# Patient Record
Sex: MALE | Race: WHITE | Employment: OTHER | URBAN - METROPOLITAN AREA
[De-identification: names, ages, dates, MRNs, and addresses within clinical notes are randomized per-mention and may not be internally consistent; named-entity substitution may affect disease eponyms.]

---

## 2017-01-03 ENCOUNTER — HOSPITAL ENCOUNTER (OUTPATIENT)
Dept: PHARMACY | Age: 82
Discharge: HOME | End: 2017-01-03
Payer: MEDICARE

## 2017-01-03 DIAGNOSIS — I48.91 ATRIAL FIBRILLATION, UNSPECIFIED TYPE (HCC): ICD-10-CM

## 2017-01-03 LAB
INR BLD: 2.9
PROTIME: 34.6 SECONDS

## 2017-01-03 PROCEDURE — G0463 HOSPITAL OUTPT CLINIC VISIT: HCPCS

## 2017-01-03 PROCEDURE — 85610 PROTHROMBIN TIME: CPT

## 2017-01-13 ENCOUNTER — HOSPITAL ENCOUNTER (OUTPATIENT)
Dept: CARDIOLOGY | Age: 82
Discharge: HOME | End: 2017-01-13
Payer: MEDICARE

## 2017-01-13 PROCEDURE — 93296 REM INTERROG EVL PM/IDS: CPT

## 2017-01-30 DIAGNOSIS — M1A.0790 IDIOPATHIC CHRONIC GOUT OF FOOT WITHOUT TOPHUS, UNSPECIFIED LATERALITY: ICD-10-CM

## 2017-01-30 DIAGNOSIS — E78.5 HYPERLIPIDEMIA, UNSPECIFIED HYPERLIPIDEMIA TYPE: ICD-10-CM

## 2017-01-30 DIAGNOSIS — I10 ESSENTIAL HYPERTENSION, BENIGN: ICD-10-CM

## 2017-01-30 DIAGNOSIS — I48.91 ATRIAL FIBRILLATION, UNSPECIFIED TYPE (HCC): ICD-10-CM

## 2017-01-30 LAB
ANION GAP SERPL CALCULATED.3IONS-SCNC: 10 MEQ/L (ref 7–13)
BUN BLDV-MCNC: 18 MG/DL (ref 8–23)
CALCIUM SERPL-MCNC: 9.2 MG/DL (ref 8.6–10.2)
CHLORIDE BLD-SCNC: 100 MEQ/L (ref 98–107)
CHOLESTEROL, TOTAL: 166 MG/DL (ref 0–199)
CO2: 27 MEQ/L (ref 22–29)
CREAT SERPL-MCNC: 0.97 MG/DL (ref 0.7–1.2)
GFR AFRICAN AMERICAN: >60
GFR NON-AFRICAN AMERICAN: >60
GLUCOSE BLD-MCNC: 91 MG/DL (ref 74–109)
HCT VFR BLD CALC: 41.8 % (ref 42–52)
HDLC SERPL-MCNC: 67 MG/DL (ref 40–59)
HEMOGLOBIN: 13.8 G/DL (ref 14–18)
LDL CHOLESTEROL CALCULATED: 78 MG/DL (ref 0–129)
MCH RBC QN AUTO: 31.7 PG (ref 27–31.3)
MCHC RBC AUTO-ENTMCNC: 33 % (ref 33–37)
MCV RBC AUTO: 96.1 FL (ref 80–100)
PDW BLD-RTO: 14.1 % (ref 11.5–14.5)
PLATELET # BLD: 186 K/UL (ref 130–400)
POTASSIUM SERPL-SCNC: 4.4 MEQ/L (ref 3.5–5.1)
PROSTATE SPECIFIC ANTIGEN: 2.67 NG/ML (ref 0–6.22)
RBC # BLD: 4.35 M/UL (ref 4.7–6.1)
SODIUM BLD-SCNC: 137 MEQ/L (ref 132–144)
TRIGL SERPL-MCNC: 106 MG/DL (ref 0–200)
URIC ACID, SERUM: 4.5 MG/DL (ref 3.4–7)
WBC # BLD: 8.4 K/UL (ref 4.8–10.8)

## 2017-01-31 ENCOUNTER — HOSPITAL ENCOUNTER (OUTPATIENT)
Dept: PHARMACY | Age: 82
Discharge: HOME | End: 2017-01-31
Payer: MEDICARE

## 2017-01-31 DIAGNOSIS — I48.91 ATRIAL FIBRILLATION, UNSPECIFIED TYPE (HCC): ICD-10-CM

## 2017-01-31 LAB
INR BLD: 2.6
PROTIME: 31 SECONDS

## 2017-01-31 PROCEDURE — 85610 PROTHROMBIN TIME: CPT | Performed by: PHARMACIST

## 2017-01-31 PROCEDURE — G0463 HOSPITAL OUTPT CLINIC VISIT: HCPCS | Performed by: PHARMACIST

## 2017-02-02 ENCOUNTER — OFFICE VISIT (OUTPATIENT)
Dept: UROLOGY | Age: 82
End: 2017-02-02

## 2017-02-02 ENCOUNTER — OFFICE VISIT (OUTPATIENT)
Dept: INTERNAL MEDICINE | Age: 82
End: 2017-02-02

## 2017-02-02 VITALS
WEIGHT: 195 LBS | HEART RATE: 74 BPM | DIASTOLIC BLOOD PRESSURE: 60 MMHG | SYSTOLIC BLOOD PRESSURE: 114 MMHG | HEIGHT: 73 IN | BODY MASS INDEX: 25.84 KG/M2

## 2017-02-02 VITALS
RESPIRATION RATE: 16 BRPM | HEIGHT: 73 IN | TEMPERATURE: 97.9 F | DIASTOLIC BLOOD PRESSURE: 68 MMHG | HEART RATE: 74 BPM | SYSTOLIC BLOOD PRESSURE: 104 MMHG | WEIGHT: 195 LBS | OXYGEN SATURATION: 99 % | BODY MASS INDEX: 25.84 KG/M2

## 2017-02-02 DIAGNOSIS — E78.5 HYPERLIPIDEMIA, UNSPECIFIED HYPERLIPIDEMIA TYPE: ICD-10-CM

## 2017-02-02 DIAGNOSIS — I10 ESSENTIAL HYPERTENSION, BENIGN: Primary | ICD-10-CM

## 2017-02-02 DIAGNOSIS — R97.20 ELEVATED PSA: Primary | ICD-10-CM

## 2017-02-02 DIAGNOSIS — F02.81 ALZHEIMER'S DEMENTIA WITH BEHAVIORAL DISTURBANCE, UNSPECIFIED TIMING OF DEMENTIA ONSET: ICD-10-CM

## 2017-02-02 DIAGNOSIS — N40.1 BPH WITH OBSTRUCTION/LOWER URINARY TRACT SYMPTOMS: ICD-10-CM

## 2017-02-02 DIAGNOSIS — Z95.0 PRESENCE OF PERMANENT CARDIAC PACEMAKER: ICD-10-CM

## 2017-02-02 DIAGNOSIS — N13.8 BPH WITH OBSTRUCTION/LOWER URINARY TRACT SYMPTOMS: ICD-10-CM

## 2017-02-02 DIAGNOSIS — D64.9 NORMOCYTIC ANEMIA: ICD-10-CM

## 2017-02-02 DIAGNOSIS — I48.91 ATRIAL FIBRILLATION, UNSPECIFIED TYPE (HCC): ICD-10-CM

## 2017-02-02 DIAGNOSIS — G30.9 ALZHEIMER'S DEMENTIA WITH BEHAVIORAL DISTURBANCE, UNSPECIFIED TIMING OF DEMENTIA ONSET: ICD-10-CM

## 2017-02-02 PROCEDURE — 4040F PNEUMOC VAC/ADMIN/RCVD: CPT | Performed by: UROLOGY

## 2017-02-02 PROCEDURE — G8420 CALC BMI NORM PARAMETERS: HCPCS | Performed by: UROLOGY

## 2017-02-02 PROCEDURE — 1123F ACP DISCUSS/DSCN MKR DOCD: CPT | Performed by: UROLOGY

## 2017-02-02 PROCEDURE — 99213 OFFICE O/P EST LOW 20 MIN: CPT | Performed by: INTERNAL MEDICINE

## 2017-02-02 PROCEDURE — G8484 FLU IMMUNIZE NO ADMIN: HCPCS | Performed by: UROLOGY

## 2017-02-02 PROCEDURE — 1036F TOBACCO NON-USER: CPT | Performed by: INTERNAL MEDICINE

## 2017-02-02 PROCEDURE — 1123F ACP DISCUSS/DSCN MKR DOCD: CPT | Performed by: INTERNAL MEDICINE

## 2017-02-02 PROCEDURE — 99213 OFFICE O/P EST LOW 20 MIN: CPT | Performed by: UROLOGY

## 2017-02-02 PROCEDURE — G8427 DOCREV CUR MEDS BY ELIG CLIN: HCPCS | Performed by: UROLOGY

## 2017-02-02 PROCEDURE — 4040F PNEUMOC VAC/ADMIN/RCVD: CPT | Performed by: INTERNAL MEDICINE

## 2017-02-02 PROCEDURE — 1036F TOBACCO NON-USER: CPT | Performed by: UROLOGY

## 2017-02-02 PROCEDURE — G8420 CALC BMI NORM PARAMETERS: HCPCS | Performed by: INTERNAL MEDICINE

## 2017-02-02 PROCEDURE — G8427 DOCREV CUR MEDS BY ELIG CLIN: HCPCS | Performed by: INTERNAL MEDICINE

## 2017-02-02 PROCEDURE — G8484 FLU IMMUNIZE NO ADMIN: HCPCS | Performed by: INTERNAL MEDICINE

## 2017-02-02 RX ORDER — FINASTERIDE 5 MG/1
5 TABLET, FILM COATED ORAL DAILY
Qty: 90 TABLET | Refills: 3 | Status: SHIPPED | OUTPATIENT
Start: 2017-02-02

## 2017-02-02 ASSESSMENT — ENCOUNTER SYMPTOMS
SHORTNESS OF BREATH: 0
ABDOMINAL PAIN: 0
ABDOMINAL DISTENTION: 0

## 2017-02-20 ASSESSMENT — ENCOUNTER SYMPTOMS
ABDOMINAL PAIN: 0
BACK PAIN: 0
WHEEZING: 0
SHORTNESS OF BREATH: 0
COUGH: 0

## 2017-02-26 ENCOUNTER — HOSPITAL ENCOUNTER (EMERGENCY)
Age: 82
Discharge: HOME | End: 2017-02-26
Attending: EMERGENCY MEDICINE
Payer: MEDICARE

## 2017-02-26 VITALS
SYSTOLIC BLOOD PRESSURE: 119 MMHG | HEART RATE: 95 BPM | BODY MASS INDEX: 25.84 KG/M2 | RESPIRATION RATE: 14 BRPM | OXYGEN SATURATION: 97 % | WEIGHT: 195 LBS | TEMPERATURE: 97.7 F | HEIGHT: 73 IN | DIASTOLIC BLOOD PRESSURE: 86 MMHG

## 2017-02-26 DIAGNOSIS — F03.90 DEMENTIA WITHOUT BEHAVIORAL DISTURBANCE, UNSPECIFIED DEMENTIA TYPE: Primary | ICD-10-CM

## 2017-02-26 PROCEDURE — 99285 EMERGENCY DEPT VISIT HI MDM: CPT

## 2017-02-26 ASSESSMENT — ENCOUNTER SYMPTOMS
COUGH: 0
NAUSEA: 0
ABDOMINAL PAIN: 0
VOMITING: 0
SHORTNESS OF BREATH: 0
DIARRHEA: 0

## 2017-02-28 ENCOUNTER — HOSPITAL ENCOUNTER (OUTPATIENT)
Dept: PHARMACY | Age: 82
Discharge: HOME | End: 2017-02-28
Payer: MEDICARE

## 2017-02-28 DIAGNOSIS — I48.91 ATRIAL FIBRILLATION, UNSPECIFIED TYPE (HCC): ICD-10-CM

## 2017-02-28 LAB
INR BLD: 3.1
PROTIME: 37.4 SECONDS

## 2017-02-28 PROCEDURE — G0463 HOSPITAL OUTPT CLINIC VISIT: HCPCS | Performed by: PHARMACIST

## 2017-02-28 PROCEDURE — 85610 PROTHROMBIN TIME: CPT | Performed by: PHARMACIST

## 2017-03-28 ENCOUNTER — HOSPITAL ENCOUNTER (OUTPATIENT)
Dept: PHARMACY | Age: 82
Discharge: HOME | End: 2017-03-28
Payer: MEDICARE

## 2017-03-28 DIAGNOSIS — I48.91 ATRIAL FIBRILLATION, UNSPECIFIED TYPE (HCC): ICD-10-CM

## 2017-03-28 LAB
INR BLD: 2.9
PROTIME: 34.6 SECONDS

## 2017-03-28 PROCEDURE — G0463 HOSPITAL OUTPT CLINIC VISIT: HCPCS

## 2017-03-28 PROCEDURE — 85610 PROTHROMBIN TIME: CPT

## 2017-04-04 ENCOUNTER — OFFICE VISIT (OUTPATIENT)
Dept: INTERNAL MEDICINE | Age: 82
End: 2017-04-04

## 2017-04-04 VITALS
HEART RATE: 82 BPM | SYSTOLIC BLOOD PRESSURE: 98 MMHG | DIASTOLIC BLOOD PRESSURE: 62 MMHG | TEMPERATURE: 97.8 F | WEIGHT: 196 LBS | HEIGHT: 73 IN | OXYGEN SATURATION: 98 % | RESPIRATION RATE: 16 BRPM | BODY MASS INDEX: 25.98 KG/M2

## 2017-04-04 DIAGNOSIS — M54.50 ACUTE RIGHT-SIDED LOW BACK PAIN WITHOUT SCIATICA: ICD-10-CM

## 2017-04-04 DIAGNOSIS — R60.0 BILATERAL EDEMA OF LOWER EXTREMITY: Primary | ICD-10-CM

## 2017-04-04 DIAGNOSIS — R60.0 BILATERAL EDEMA OF LOWER EXTREMITY: ICD-10-CM

## 2017-04-04 LAB
ALBUMIN SERPL-MCNC: 3.8 G/DL (ref 3.9–4.9)
ALP BLD-CCNC: 46 U/L (ref 35–104)
ALT SERPL-CCNC: 22 U/L (ref 0–41)
ANION GAP SERPL CALCULATED.3IONS-SCNC: 10 MEQ/L (ref 7–13)
AST SERPL-CCNC: 23 U/L (ref 0–40)
BILIRUB SERPL-MCNC: 0.6 MG/DL (ref 0–1.2)
BILIRUBIN URINE: NEGATIVE
BLOOD, URINE: NEGATIVE
BUN BLDV-MCNC: 15 MG/DL (ref 8–23)
CALCIUM SERPL-MCNC: 9.3 MG/DL (ref 8.6–10.2)
CHLORIDE BLD-SCNC: 101 MEQ/L (ref 98–107)
CLARITY: CLEAR
CO2: 27 MEQ/L (ref 22–29)
COLOR: YELLOW
CREAT SERPL-MCNC: 0.96 MG/DL (ref 0.7–1.2)
GFR AFRICAN AMERICAN: >60
GFR NON-AFRICAN AMERICAN: >60
GLOBULIN: 2.2 G/DL (ref 2.3–3.5)
GLUCOSE BLD-MCNC: 83 MG/DL (ref 74–109)
GLUCOSE URINE: NEGATIVE MG/DL
HCT VFR BLD CALC: 37 % (ref 42–52)
HEMOGLOBIN: 12.5 G/DL (ref 14–18)
KETONES, URINE: NEGATIVE MG/DL
LEUKOCYTE ESTERASE, URINE: NEGATIVE
MCH RBC QN AUTO: 32.4 PG (ref 27–31.3)
MCHC RBC AUTO-ENTMCNC: 33.8 % (ref 33–37)
MCV RBC AUTO: 95.8 FL (ref 80–100)
NITRITE, URINE: NEGATIVE
PDW BLD-RTO: 13.6 % (ref 11.5–14.5)
PH UA: 7.5 (ref 5–9)
PLATELET # BLD: 178 K/UL (ref 130–400)
POTASSIUM SERPL-SCNC: 4.2 MEQ/L (ref 3.5–5.1)
PROTEIN UA: NEGATIVE MG/DL
RBC # BLD: 3.87 M/UL (ref 4.7–6.1)
SODIUM BLD-SCNC: 138 MEQ/L (ref 132–144)
SPECIFIC GRAVITY UA: 1.01 (ref 1–1.03)
TOTAL PROTEIN: 6 G/DL (ref 6.4–8.1)
URINE REFLEX TO CULTURE: NORMAL
UROBILINOGEN, URINE: 0.2 E.U./DL
WBC # BLD: 6.6 K/UL (ref 4.8–10.8)

## 2017-04-04 PROCEDURE — 1036F TOBACCO NON-USER: CPT | Performed by: INTERNAL MEDICINE

## 2017-04-04 PROCEDURE — 1123F ACP DISCUSS/DSCN MKR DOCD: CPT | Performed by: INTERNAL MEDICINE

## 2017-04-04 PROCEDURE — 99213 OFFICE O/P EST LOW 20 MIN: CPT | Performed by: INTERNAL MEDICINE

## 2017-04-04 PROCEDURE — G8427 DOCREV CUR MEDS BY ELIG CLIN: HCPCS | Performed by: INTERNAL MEDICINE

## 2017-04-04 PROCEDURE — G8420 CALC BMI NORM PARAMETERS: HCPCS | Performed by: INTERNAL MEDICINE

## 2017-04-04 PROCEDURE — 4040F PNEUMOC VAC/ADMIN/RCVD: CPT | Performed by: INTERNAL MEDICINE

## 2017-04-09 ASSESSMENT — ENCOUNTER SYMPTOMS
COUGH: 0
WHEEZING: 0
ABDOMINAL PAIN: 0
SHORTNESS OF BREATH: 0

## 2017-04-14 ENCOUNTER — HOSPITAL ENCOUNTER (OUTPATIENT)
Dept: CARDIOLOGY | Age: 82
Discharge: HOME | End: 2017-04-14
Payer: MEDICARE

## 2017-04-14 PROCEDURE — 93296 REM INTERROG EVL PM/IDS: CPT

## 2017-05-09 ENCOUNTER — HOSPITAL ENCOUNTER (OUTPATIENT)
Dept: PHARMACY | Age: 82
Discharge: HOME | End: 2017-05-09
Payer: MEDICARE

## 2017-05-09 DIAGNOSIS — I48.91 ATRIAL FIBRILLATION, UNSPECIFIED TYPE (HCC): ICD-10-CM

## 2017-05-09 LAB
INR BLD: 2.8
PROTIME: 33.3 SECONDS

## 2017-05-09 PROCEDURE — 99211 OFF/OP EST MAY X REQ PHY/QHP: CPT

## 2017-05-09 PROCEDURE — 85610 PROTHROMBIN TIME: CPT

## 2017-05-17 ENCOUNTER — OFFICE VISIT (OUTPATIENT)
Dept: INTERNAL MEDICINE | Age: 82
End: 2017-05-17

## 2017-05-17 VITALS
SYSTOLIC BLOOD PRESSURE: 94 MMHG | OXYGEN SATURATION: 100 % | WEIGHT: 189 LBS | BODY MASS INDEX: 25.05 KG/M2 | RESPIRATION RATE: 16 BRPM | DIASTOLIC BLOOD PRESSURE: 62 MMHG | HEART RATE: 68 BPM | TEMPERATURE: 97.9 F | HEIGHT: 73 IN

## 2017-05-17 DIAGNOSIS — I10 ESSENTIAL HYPERTENSION, BENIGN: Primary | ICD-10-CM

## 2017-05-17 DIAGNOSIS — D64.9 NORMOCYTIC ANEMIA: ICD-10-CM

## 2017-05-17 DIAGNOSIS — R49.9 VOICE QUALITY DISORDER: ICD-10-CM

## 2017-05-17 DIAGNOSIS — M1A.0790 IDIOPATHIC CHRONIC GOUT OF FOOT WITHOUT TOPHUS, UNSPECIFIED LATERALITY: ICD-10-CM

## 2017-05-17 PROCEDURE — 99213 OFFICE O/P EST LOW 20 MIN: CPT | Performed by: INTERNAL MEDICINE

## 2017-05-17 PROCEDURE — 1036F TOBACCO NON-USER: CPT | Performed by: INTERNAL MEDICINE

## 2017-05-17 PROCEDURE — 1123F ACP DISCUSS/DSCN MKR DOCD: CPT | Performed by: INTERNAL MEDICINE

## 2017-05-17 PROCEDURE — 4040F PNEUMOC VAC/ADMIN/RCVD: CPT | Performed by: INTERNAL MEDICINE

## 2017-05-17 PROCEDURE — G8420 CALC BMI NORM PARAMETERS: HCPCS | Performed by: INTERNAL MEDICINE

## 2017-05-17 PROCEDURE — G8427 DOCREV CUR MEDS BY ELIG CLIN: HCPCS | Performed by: INTERNAL MEDICINE

## 2017-05-17 RX ORDER — ALLOPURINOL 300 MG/1
TABLET ORAL
Qty: 45 TABLET | Refills: 1 | Status: ON HOLD | OUTPATIENT
Start: 2017-05-17 | End: 2019-06-21

## 2017-06-20 ENCOUNTER — HOSPITAL ENCOUNTER (OUTPATIENT)
Dept: PHARMACY | Age: 82
Setting detail: THERAPIES SERIES
Discharge: HOME | End: 2017-06-20
Payer: MEDICARE

## 2017-06-20 DIAGNOSIS — I48.91 ATRIAL FIBRILLATION, UNSPECIFIED TYPE (HCC): ICD-10-CM

## 2017-06-20 LAB
INR BLD: 3.1
PROTIME: 36.9 SECONDS

## 2017-06-20 PROCEDURE — 99211 OFF/OP EST MAY X REQ PHY/QHP: CPT | Performed by: PHARMACIST

## 2017-06-20 PROCEDURE — 85610 PROTHROMBIN TIME: CPT | Performed by: PHARMACIST

## 2017-07-02 ENCOUNTER — HOSPITAL ENCOUNTER (EMERGENCY)
Age: 82
Discharge: HOME | End: 2017-07-02
Attending: FAMILY MEDICINE
Payer: MEDICARE

## 2017-07-02 VITALS
TEMPERATURE: 97.6 F | RESPIRATION RATE: 18 BRPM | HEART RATE: 88 BPM | OXYGEN SATURATION: 97 % | WEIGHT: 182 LBS | HEIGHT: 73 IN | SYSTOLIC BLOOD PRESSURE: 120 MMHG | BODY MASS INDEX: 24.12 KG/M2 | DIASTOLIC BLOOD PRESSURE: 78 MMHG

## 2017-07-02 DIAGNOSIS — R10.13 EPIGASTRIC ABDOMINAL PAIN: Primary | ICD-10-CM

## 2017-07-02 PROCEDURE — 99283 EMERGENCY DEPT VISIT LOW MDM: CPT

## 2017-07-02 RX ORDER — RISPERIDONE 0.5 MG/1
0.5 TABLET, FILM COATED ORAL NIGHTLY PRN
COMMUNITY

## 2017-07-02 ASSESSMENT — ENCOUNTER SYMPTOMS
WHEEZING: 0
CHEST TIGHTNESS: 0
APNEA: 0
CHOKING: 0
RESPIRATORY NEGATIVE: 1
COUGH: 0
STRIDOR: 0
SHORTNESS OF BREATH: 0

## 2017-07-09 ASSESSMENT — ENCOUNTER SYMPTOMS
BACK PAIN: 0
CONSTIPATION: 0
DIARRHEA: 0
VOMITING: 0
NAUSEA: 0
BLOOD IN STOOL: 0
ABDOMINAL PAIN: 0
SHORTNESS OF BREATH: 0
COUGH: 0

## 2017-07-14 ENCOUNTER — HOSPITAL ENCOUNTER (OUTPATIENT)
Dept: CARDIOLOGY | Age: 82
Discharge: HOME | End: 2017-07-14
Payer: MEDICARE

## 2017-07-14 PROCEDURE — 93279 PRGRMG DEV EVAL PM/LDLS PM: CPT

## 2017-07-24 RX ORDER — WARFARIN SODIUM 1 MG/1
TABLET ORAL
Qty: 100 TABLET | Refills: 1 | Status: SHIPPED | OUTPATIENT
Start: 2017-07-24 | End: 2018-02-22 | Stop reason: SDUPTHER

## 2017-08-01 ENCOUNTER — HOSPITAL ENCOUNTER (OUTPATIENT)
Dept: PHARMACY | Age: 82
Setting detail: THERAPIES SERIES
Discharge: HOME | End: 2017-08-01
Payer: MEDICARE

## 2017-08-01 DIAGNOSIS — I48.91 ATRIAL FIBRILLATION, UNSPECIFIED TYPE (HCC): ICD-10-CM

## 2017-08-01 LAB
INR BLD: 3
PROTIME: 36.1 SECONDS

## 2017-08-01 PROCEDURE — 99211 OFF/OP EST MAY X REQ PHY/QHP: CPT | Performed by: PHARMACIST

## 2017-08-01 PROCEDURE — 85610 PROTHROMBIN TIME: CPT | Performed by: PHARMACIST

## 2017-09-12 ENCOUNTER — HOSPITAL ENCOUNTER (OUTPATIENT)
Dept: PHARMACY | Age: 82
Setting detail: THERAPIES SERIES
Discharge: HOME | End: 2017-09-12
Payer: MEDICARE

## 2017-09-12 DIAGNOSIS — I48.91 ATRIAL FIBRILLATION, UNSPECIFIED TYPE (HCC): ICD-10-CM

## 2017-09-12 LAB
INR BLD: 2.3
PROTIME: 27.8 SECONDS

## 2017-09-12 PROCEDURE — 85610 PROTHROMBIN TIME: CPT | Performed by: PHARMACIST

## 2017-09-12 PROCEDURE — 99211 OFF/OP EST MAY X REQ PHY/QHP: CPT | Performed by: PHARMACIST

## 2017-09-13 LAB
ANION GAP SERPL CALCULATED.3IONS-SCNC: 15 MEQ/L (ref 7–13)
BUN BLDV-MCNC: 18 MG/DL (ref 8–23)
CHLORIDE BLD-SCNC: 102 MEQ/L (ref 98–107)
CHOLESTEROL, TOTAL: 142 MG/DL (ref 0–199)
CO2: 25 MEQ/L (ref 22–29)
CREAT SERPL-MCNC: 0.97 MG/DL (ref 0.7–1.2)
GFR AFRICAN AMERICAN: >60
GFR NON-AFRICAN AMERICAN: >60
HDLC SERPL-MCNC: 53 MG/DL (ref 40–59)
LDL CHOLESTEROL CALCULATED: 64 MG/DL (ref 0–129)
MAGNESIUM: 2 MG/DL (ref 1.7–2.3)
POTASSIUM SERPL-SCNC: 4.5 MEQ/L (ref 3.5–5.1)
SODIUM BLD-SCNC: 142 MEQ/L (ref 132–144)
TOTAL CK: 67 U/L (ref 0–190)
TRIGL SERPL-MCNC: 124 MG/DL (ref 0–200)

## 2017-10-13 ENCOUNTER — HOSPITAL ENCOUNTER (OUTPATIENT)
Dept: CARDIOLOGY | Age: 82
Discharge: HOME | End: 2017-10-13
Payer: MEDICARE

## 2017-10-13 PROCEDURE — 93296 REM INTERROG EVL PM/IDS: CPT

## 2017-10-24 ENCOUNTER — HOSPITAL ENCOUNTER (OUTPATIENT)
Dept: PHARMACY | Age: 82
Setting detail: THERAPIES SERIES
Discharge: HOME | End: 2017-10-24
Payer: MEDICARE

## 2017-10-24 DIAGNOSIS — I48.91 ATRIAL FIBRILLATION, UNSPECIFIED TYPE (HCC): ICD-10-CM

## 2017-10-24 LAB
INR BLD: 2.7
PROTIME: 32.9 SECONDS

## 2017-10-24 PROCEDURE — 85610 PROTHROMBIN TIME: CPT

## 2017-10-24 PROCEDURE — 99211 OFF/OP EST MAY X REQ PHY/QHP: CPT

## 2017-12-05 ENCOUNTER — HOSPITAL ENCOUNTER (OUTPATIENT)
Dept: PHARMACY | Age: 82
Setting detail: THERAPIES SERIES
Discharge: HOME | End: 2017-12-05
Payer: MEDICARE

## 2017-12-05 DIAGNOSIS — I48.91 ATRIAL FIBRILLATION, UNSPECIFIED TYPE (HCC): ICD-10-CM

## 2017-12-05 LAB
INR BLD: 2.4
PROTIME: 29 SECONDS

## 2017-12-05 PROCEDURE — 85610 PROTHROMBIN TIME: CPT | Performed by: PHARMACIST

## 2017-12-05 PROCEDURE — 99211 OFF/OP EST MAY X REQ PHY/QHP: CPT | Performed by: PHARMACIST

## 2017-12-05 NOTE — PROGRESS NOTES
Mr. Suze Cantu is a 81 y.o. y/o male with history of Afib who presents today for anticoagulation monitoring and adjustment.   INR 2.4 is therapeutic for this patient (goal range 2-3) and is reflective of 7 mg TWD  Patient verifies current dosing regimen, patient able to verbally recall dose  Patient reports no  missed doses since last INR   Patient denies s/sx clotting and/or stroke  Patient denies hematuria, epistaxis, rectal bleeding  Patient denies changes in diet, alcohol, or tobacco use  Reviewed medication list and drug allergies with patient, updated any medication additions or modifications accordingly  Patient also denies any pending medical or dental procedures scheduled at this time  Patient was instructed to continue 7mg TWD and RTC 6 weeks

## 2018-01-12 ENCOUNTER — HOSPITAL ENCOUNTER (OUTPATIENT)
Dept: CARDIOLOGY | Age: 83
Discharge: HOME | End: 2018-01-12
Payer: MEDICARE

## 2018-01-12 PROCEDURE — 93296 REM INTERROG EVL PM/IDS: CPT

## 2018-01-16 ENCOUNTER — HOSPITAL ENCOUNTER (OUTPATIENT)
Dept: PHARMACY | Age: 83
Setting detail: THERAPIES SERIES
Discharge: HOME | End: 2018-01-16
Payer: MEDICARE

## 2018-01-16 DIAGNOSIS — I48.91 ATRIAL FIBRILLATION, UNSPECIFIED TYPE (HCC): ICD-10-CM

## 2018-01-16 LAB
INR BLD: 2.5
PROTIME: 30.5 SECONDS

## 2018-01-16 PROCEDURE — 99211 OFF/OP EST MAY X REQ PHY/QHP: CPT | Performed by: PHARMACIST

## 2018-01-16 PROCEDURE — 85610 PROTHROMBIN TIME: CPT | Performed by: PHARMACIST

## 2018-01-26 DIAGNOSIS — R97.20 ELEVATED PSA: ICD-10-CM

## 2018-01-26 DIAGNOSIS — N13.8 BPH WITH OBSTRUCTION/LOWER URINARY TRACT SYMPTOMS: ICD-10-CM

## 2018-01-26 DIAGNOSIS — N40.1 BPH WITH OBSTRUCTION/LOWER URINARY TRACT SYMPTOMS: ICD-10-CM

## 2018-01-26 LAB
ANION GAP SERPL CALCULATED.3IONS-SCNC: 12 MEQ/L (ref 7–13)
BUN BLDV-MCNC: 17 MG/DL (ref 8–23)
CHLORIDE BLD-SCNC: 101 MEQ/L (ref 98–107)
CO2: 28 MEQ/L (ref 22–29)
CREAT SERPL-MCNC: 0.84 MG/DL (ref 0.7–1.2)
GFR AFRICAN AMERICAN: >60
GFR NON-AFRICAN AMERICAN: >60
MAGNESIUM: 1.8 MG/DL (ref 1.7–2.3)
POTASSIUM SERPL-SCNC: 4.5 MEQ/L (ref 3.5–5.1)
PROSTATE SPECIFIC ANTIGEN: 1.95 NG/ML (ref 0–6.22)
SODIUM BLD-SCNC: 141 MEQ/L (ref 132–144)
TSH SERPL DL<=0.05 MIU/L-ACNC: 3.68 UIU/ML (ref 0.27–4.2)

## 2018-02-01 ENCOUNTER — OFFICE VISIT (OUTPATIENT)
Dept: UROLOGY | Age: 83
End: 2018-02-01
Payer: MEDICARE

## 2018-02-01 VITALS
HEART RATE: 78 BPM | SYSTOLIC BLOOD PRESSURE: 100 MMHG | HEIGHT: 72 IN | BODY MASS INDEX: 24.79 KG/M2 | WEIGHT: 183 LBS | DIASTOLIC BLOOD PRESSURE: 54 MMHG

## 2018-02-01 DIAGNOSIS — N40.1 BPH WITH OBSTRUCTION/LOWER URINARY TRACT SYMPTOMS: Primary | ICD-10-CM

## 2018-02-01 DIAGNOSIS — N13.8 BPH WITH OBSTRUCTION/LOWER URINARY TRACT SYMPTOMS: Primary | ICD-10-CM

## 2018-02-01 DIAGNOSIS — Z87.448 H/O HEMATURIA: ICD-10-CM

## 2018-02-01 PROCEDURE — 99213 OFFICE O/P EST LOW 20 MIN: CPT | Performed by: UROLOGY

## 2018-02-01 PROCEDURE — G8420 CALC BMI NORM PARAMETERS: HCPCS | Performed by: UROLOGY

## 2018-02-01 PROCEDURE — 1036F TOBACCO NON-USER: CPT | Performed by: UROLOGY

## 2018-02-01 PROCEDURE — G8484 FLU IMMUNIZE NO ADMIN: HCPCS | Performed by: UROLOGY

## 2018-02-01 PROCEDURE — 4040F PNEUMOC VAC/ADMIN/RCVD: CPT | Performed by: UROLOGY

## 2018-02-01 PROCEDURE — G8427 DOCREV CUR MEDS BY ELIG CLIN: HCPCS | Performed by: UROLOGY

## 2018-02-01 PROCEDURE — 1123F ACP DISCUSS/DSCN MKR DOCD: CPT | Performed by: UROLOGY

## 2018-02-01 ASSESSMENT — ENCOUNTER SYMPTOMS
ABDOMINAL PAIN: 0
ABDOMINAL DISTENTION: 0
SHORTNESS OF BREATH: 0

## 2018-02-16 ENCOUNTER — HOSPITAL ENCOUNTER (EMERGENCY)
Age: 83
Discharge: HOME | End: 2018-02-16
Attending: EMERGENCY MEDICINE
Payer: MEDICARE

## 2018-02-16 ENCOUNTER — APPOINTMENT (OUTPATIENT)
Dept: CT IMAGING | Age: 83
End: 2018-02-16
Payer: MEDICARE

## 2018-02-16 VITALS
TEMPERATURE: 97.5 F | HEART RATE: 84 BPM | RESPIRATION RATE: 18 BRPM | WEIGHT: 183 LBS | BODY MASS INDEX: 24.79 KG/M2 | HEIGHT: 72 IN | SYSTOLIC BLOOD PRESSURE: 137 MMHG | OXYGEN SATURATION: 98 % | DIASTOLIC BLOOD PRESSURE: 98 MMHG

## 2018-02-16 DIAGNOSIS — N20.0 KIDNEY STONE: ICD-10-CM

## 2018-02-16 DIAGNOSIS — R30.0 DYSURIA: Primary | ICD-10-CM

## 2018-02-16 LAB
ALBUMIN SERPL-MCNC: 4.1 G/DL (ref 3.9–4.9)
ALP BLD-CCNC: 89 U/L (ref 35–104)
ALT SERPL-CCNC: 46 U/L (ref 0–41)
ANION GAP SERPL CALCULATED.3IONS-SCNC: 16 MEQ/L (ref 7–13)
APTT: 47 SEC (ref 21.6–35.4)
AST SERPL-CCNC: 51 U/L (ref 0–40)
BACTERIA: NORMAL /HPF
BASOPHILS ABSOLUTE: 0 K/UL (ref 0–0.2)
BASOPHILS RELATIVE PERCENT: 0.4 %
BILIRUB SERPL-MCNC: 0.5 MG/DL (ref 0–1.2)
BILIRUBIN URINE: NEGATIVE
BLOOD, URINE: NEGATIVE
BUN BLDV-MCNC: 21 MG/DL (ref 8–23)
CALCIUM SERPL-MCNC: 9.1 MG/DL (ref 8.6–10.2)
CASTS: NORMAL /LPF
CHLORIDE BLD-SCNC: 95 MEQ/L (ref 98–107)
CLARITY: CLEAR
CO2: 23 MEQ/L (ref 22–29)
COLOR: YELLOW
CREAT SERPL-MCNC: 0.85 MG/DL (ref 0.7–1.2)
EOSINOPHILS ABSOLUTE: 0 K/UL (ref 0–0.7)
EOSINOPHILS RELATIVE PERCENT: 0.7 %
EPITHELIAL CELLS, UA: NORMAL /HPF
GFR AFRICAN AMERICAN: >60
GFR NON-AFRICAN AMERICAN: >60
GLOBULIN: 3 G/DL (ref 2.3–3.5)
GLUCOSE BLD-MCNC: 96 MG/DL (ref 74–109)
GLUCOSE URINE: NEGATIVE MG/DL
HCT VFR BLD CALC: 44.6 % (ref 42–52)
HEMOGLOBIN: 15.2 G/DL (ref 14–18)
INR BLD: 1.9
KETONES, URINE: NEGATIVE MG/DL
LEUKOCYTE ESTERASE, URINE: NEGATIVE
LIPASE: 54 U/L (ref 13–60)
LYMPHOCYTES ABSOLUTE: 1.2 K/UL (ref 1–4.8)
LYMPHOCYTES RELATIVE PERCENT: 28.1 %
MAGNESIUM: 2.3 MG/DL (ref 1.7–2.3)
MCH RBC QN AUTO: 32.7 PG (ref 27–31.3)
MCHC RBC AUTO-ENTMCNC: 34.1 % (ref 33–37)
MCV RBC AUTO: 95.8 FL (ref 80–100)
MONOCYTES ABSOLUTE: 0.6 K/UL (ref 0.2–0.8)
MONOCYTES RELATIVE PERCENT: 13.4 %
MUCUS: PRESENT
NEUTROPHILS ABSOLUTE: 2.6 K/UL (ref 1.4–6.5)
NEUTROPHILS RELATIVE PERCENT: 57.4 %
NITRITE, URINE: NEGATIVE
PDW BLD-RTO: 13.2 % (ref 11.5–14.5)
PH UA: 7.5 (ref 5–9)
PLATELET # BLD: 261 K/UL (ref 130–400)
POTASSIUM SERPL-SCNC: 4.7 MEQ/L (ref 3.5–5.1)
PROTEIN UA: 100 MG/DL
PROTHROMBIN TIME: 20.4 SEC (ref 8.1–13.7)
RBC # BLD: 4.66 M/UL (ref 4.7–6.1)
RBC UA: NORMAL /HPF (ref 0–2)
SODIUM BLD-SCNC: 134 MEQ/L (ref 132–144)
SPECIFIC GRAVITY UA: 1.01 (ref 1–1.03)
TOTAL PROTEIN: 7.1 G/DL (ref 6.4–8.1)
UROBILINOGEN, URINE: 0.2 E.U./DL
WBC # BLD: 4.4 K/UL (ref 4.8–10.8)
WBC UA: NORMAL /HPF (ref 0–5)

## 2018-02-16 PROCEDURE — 80053 COMPREHEN METABOLIC PANEL: CPT

## 2018-02-16 PROCEDURE — 83735 ASSAY OF MAGNESIUM: CPT

## 2018-02-16 PROCEDURE — S0028 INJECTION, FAMOTIDINE, 20 MG: HCPCS | Performed by: EMERGENCY MEDICINE

## 2018-02-16 PROCEDURE — 6360000004 HC RX CONTRAST MEDICATION: Performed by: EMERGENCY MEDICINE

## 2018-02-16 PROCEDURE — 2580000003 HC RX 258: Performed by: EMERGENCY MEDICINE

## 2018-02-16 PROCEDURE — 36415 COLL VENOUS BLD VENIPUNCTURE: CPT

## 2018-02-16 PROCEDURE — 85610 PROTHROMBIN TIME: CPT

## 2018-02-16 PROCEDURE — 81001 URINALYSIS AUTO W/SCOPE: CPT

## 2018-02-16 PROCEDURE — 2500000003 HC RX 250 WO HCPCS: Performed by: EMERGENCY MEDICINE

## 2018-02-16 PROCEDURE — 85025 COMPLETE CBC W/AUTO DIFF WBC: CPT

## 2018-02-16 PROCEDURE — 85730 THROMBOPLASTIN TIME PARTIAL: CPT

## 2018-02-16 PROCEDURE — 83690 ASSAY OF LIPASE: CPT

## 2018-02-16 PROCEDURE — 74177 CT ABD & PELVIS W/CONTRAST: CPT

## 2018-02-16 PROCEDURE — 99284 EMERGENCY DEPT VISIT MOD MDM: CPT

## 2018-02-16 PROCEDURE — 96374 THER/PROPH/DIAG INJ IV PUSH: CPT

## 2018-02-16 RX ORDER — 0.9 % SODIUM CHLORIDE 0.9 %
1000 INTRAVENOUS SOLUTION INTRAVENOUS ONCE
Status: COMPLETED | OUTPATIENT
Start: 2018-02-16 | End: 2018-02-16

## 2018-02-16 RX ORDER — SODIUM CHLORIDE 0.9 % (FLUSH) 0.9 %
10 SYRINGE (ML) INJECTION ONCE
Status: COMPLETED | OUTPATIENT
Start: 2018-02-16 | End: 2018-02-16

## 2018-02-16 RX ORDER — PAROXETINE 10 MG/1
10 TABLET, FILM COATED ORAL NIGHTLY
COMMUNITY

## 2018-02-16 RX ADMIN — FAMOTIDINE 20 MG: 10 INJECTION INTRAVENOUS at 10:25

## 2018-02-16 RX ADMIN — SODIUM CHLORIDE, PRESERVATIVE FREE 10 ML: 5 INJECTION INTRAVENOUS at 11:32

## 2018-02-16 RX ADMIN — SODIUM CHLORIDE 1000 ML: 9 INJECTION, SOLUTION INTRAVENOUS at 10:25

## 2018-02-16 RX ADMIN — IOPAMIDOL 100 ML: 755 INJECTION, SOLUTION INTRAVENOUS at 11:31

## 2018-02-16 ASSESSMENT — ENCOUNTER SYMPTOMS
SORE THROAT: 0
NAUSEA: 0
SHORTNESS OF BREATH: 0
VOMITING: 0
DIARRHEA: 0
BACK PAIN: 0
ABDOMINAL PAIN: 1
COUGH: 0

## 2018-02-16 NOTE — ED PROVIDER NOTES
Social History Narrative    ADLs and IADLs intact                 PHYSICAL EXAM       ED Triage Vitals   BP Temp Temp Source Pulse Resp SpO2 Height Weight   02/16/18 1001 02/16/18 1001 02/16/18 1001 02/16/18 1001 02/16/18 1001 02/16/18 1001 02/16/18 1003 02/16/18 1003   111/83 97.5 °F (36.4 °C) Oral 85 18 100 % 6' (1.829 m) 183 lb (83 kg)       Physical Exam   Constitutional: He is oriented to person, place, and time. He appears well-developed. HENT:   Head: Normocephalic. Right Ear: External ear normal.   Left Ear: External ear normal.   Mouth/Throat: Oropharynx is clear and moist.   Eyes: Conjunctivae are normal. Pupils are equal, round, and reactive to light. Neck: Normal range of motion. Neck supple. Cardiovascular: Normal rate, regular rhythm and normal heart sounds. Pulmonary/Chest: Effort normal and breath sounds normal.   Abdominal: Soft. Bowel sounds are normal. He exhibits no distension. There is no tenderness. Musculoskeletal: Normal range of motion. Neurological: He is alert and oriented to person, place, and time. Skin: Skin is warm and dry. Psychiatric: He has a normal mood and affect. Nursing note and vitals reviewed. MDM  80 yo male presents to the ED with ab pain, dysuria. Pt given 1 L NS, IV pepcid with moderate relief. Pt is afebrile, hemodynamically stable. Labs only remarkable for WBC 4.4.  UA negative for infx. CT done due to ab pain and dysuria. CT shows bladder diverticuli containing some echogenic debris and/or bladder calculi. Pt likely with recently passed bladder calculi as cause of pain. Pt and family educated about the results. Pt reassessed and feels well. Pt to f/u with pcp. FINAL IMPRESSION      1. Dysuria    2.  Kidney stone          DISPOSITION/PLAN   DISPOSITION Decision To Discharge 02/16/2018 12:18:19 PM        DISCHARGE MEDICATIONS:  New Prescriptions    No medications on file            Milly Tello MD (electronically signed)  Attending Emergency Physician            Consuelo Bach MD  02/16/18 0640

## 2018-02-16 NOTE — ED NOTES
Patients family given dc instructions. Verbalized understanding. Pt 2 person assist to get to wheelchair. Pt taken out to daughters car via w/c and assisted by 2 into the car.        Rubin Tavares RN  02/16/18 5311

## 2018-02-22 RX ORDER — WARFARIN SODIUM 1 MG/1
TABLET ORAL
Qty: 100 TABLET | Refills: 1 | Status: SHIPPED | OUTPATIENT
Start: 2018-02-22 | End: 2018-09-04 | Stop reason: SDUPTHER

## 2018-03-09 ENCOUNTER — HOSPITAL ENCOUNTER (OUTPATIENT)
Dept: PHARMACY | Age: 83
Setting detail: THERAPIES SERIES
Discharge: HOME | End: 2018-03-09
Payer: MEDICARE

## 2018-03-09 DIAGNOSIS — I48.91 ATRIAL FIBRILLATION, UNSPECIFIED TYPE (HCC): ICD-10-CM

## 2018-03-09 LAB
INR BLD: 4.3
PROTIME: 51.5 SECONDS

## 2018-03-09 PROCEDURE — 85610 PROTHROMBIN TIME: CPT | Performed by: PHARMACIST

## 2018-03-09 PROCEDURE — 99211 OFF/OP EST MAY X REQ PHY/QHP: CPT | Performed by: PHARMACIST

## 2018-03-16 ENCOUNTER — HOSPITAL ENCOUNTER (OUTPATIENT)
Dept: PHARMACY | Age: 83
Setting detail: THERAPIES SERIES
Discharge: HOME | End: 2018-03-16
Payer: MEDICARE

## 2018-03-16 DIAGNOSIS — I48.91 ATRIAL FIBRILLATION, UNSPECIFIED TYPE (HCC): ICD-10-CM

## 2018-03-16 LAB
INR BLD: 3.2
PROTIME: 38 SECONDS

## 2018-03-16 PROCEDURE — 99211 OFF/OP EST MAY X REQ PHY/QHP: CPT

## 2018-03-16 PROCEDURE — 85610 PROTHROMBIN TIME: CPT

## 2018-03-16 NOTE — PROGRESS NOTES
Mr. Tc Basurto is a 81 y.o. y/o male with history of Afib who presents today for anticoagulation monitoring and adjustment.     INR 3.2 is supratherapeutic for this patient (goal range 2-3) and is reflective of 5 mg TWD -- Held 1 mg last Friday (3/9/18)    Patient verifies current dosing regimen, patient able to verbally recall dose  Patient reports 0 missed doses since last INR   Patient denies s/sx clotting and/or stroke  Patient denies hematuria, epistaxis, rectal bleeding    Patient denies changes in diet, alcohol, or tobacco use -- Diet has begun to improve, eating more    Reviewed medication list and drug allergies with patient, updated any medication additions or modifications accordingly  Patient also denies any pending medical or dental procedures scheduled at this time  Patient was instructed to continue 6 mg TWD and RTC 2 weeks    Lucía Ibrahim PharmD  Staff Pharmacist  3/16/2018 10:47 AM

## 2018-03-30 ENCOUNTER — HOSPITAL ENCOUNTER (OUTPATIENT)
Dept: PHARMACY | Age: 83
Setting detail: THERAPIES SERIES
Discharge: HOME | End: 2018-03-30
Payer: MEDICARE

## 2018-03-30 DIAGNOSIS — I48.91 ATRIAL FIBRILLATION, UNSPECIFIED TYPE (HCC): ICD-10-CM

## 2018-03-30 LAB
INR BLD: 2.4
PROTIME: 28.6 SECONDS

## 2018-03-30 PROCEDURE — 99211 OFF/OP EST MAY X REQ PHY/QHP: CPT

## 2018-03-30 PROCEDURE — 85610 PROTHROMBIN TIME: CPT

## 2018-03-30 NOTE — PROGRESS NOTES
Mr. Nicholaus Bernheim is a 81 y.o. y/o male with history of Afib who presents today for anticoagulation monitoring and adjustment.   INR 2.4 is therapeutic for this patient (goal range 2-3) and is reflective of 6 mg TWD  Patient verifies current dosing regimen, patient able to verbally recall dose  Patient reports 0  missed doses since last INR   Patient denies s/sx clotting and/or stroke  Patient denies hematuria, epistaxis, rectal bleeding  Patient denies changes in diet, alcohol, or tobacco use  Reviewed medication list and drug allergies with patient, updated any medication additions or modifications accordingly  Patient also denies any pending medical or dental procedures scheduled at this time  Patient was instructed to continue 6 mg TWD and RTC 2 weeks

## 2018-04-13 ENCOUNTER — HOSPITAL ENCOUNTER (OUTPATIENT)
Dept: CARDIOLOGY | Age: 83
Discharge: HOME | End: 2018-04-13
Payer: MEDICARE

## 2018-04-13 ENCOUNTER — HOSPITAL ENCOUNTER (OUTPATIENT)
Dept: PHARMACY | Age: 83
Setting detail: THERAPIES SERIES
Discharge: HOME | End: 2018-04-13
Payer: MEDICARE

## 2018-04-13 DIAGNOSIS — I48.91 ATRIAL FIBRILLATION, UNSPECIFIED TYPE (HCC): ICD-10-CM

## 2018-04-13 LAB
INR BLD: 1.9
PROTIME: 23 SECONDS

## 2018-04-13 PROCEDURE — 93296 REM INTERROG EVL PM/IDS: CPT

## 2018-04-13 PROCEDURE — 85610 PROTHROMBIN TIME: CPT | Performed by: PHARMACIST

## 2018-04-13 PROCEDURE — 99211 OFF/OP EST MAY X REQ PHY/QHP: CPT | Performed by: PHARMACIST

## 2018-05-01 ENCOUNTER — HOSPITAL ENCOUNTER (OUTPATIENT)
Dept: PHARMACY | Age: 83
Setting detail: THERAPIES SERIES
Discharge: HOME | End: 2018-05-01
Payer: MEDICARE

## 2018-05-01 DIAGNOSIS — I48.91 ATRIAL FIBRILLATION, UNSPECIFIED TYPE (HCC): ICD-10-CM

## 2018-05-01 LAB
INR BLD: 1.6
PROTIME: 19.5 SECONDS

## 2018-05-01 PROCEDURE — 99211 OFF/OP EST MAY X REQ PHY/QHP: CPT

## 2018-05-01 PROCEDURE — 85610 PROTHROMBIN TIME: CPT

## 2018-05-15 ENCOUNTER — HOSPITAL ENCOUNTER (OUTPATIENT)
Dept: PHARMACY | Age: 83
Setting detail: THERAPIES SERIES
Discharge: HOME | End: 2018-05-15
Payer: MEDICARE

## 2018-05-15 DIAGNOSIS — I48.91 ATRIAL FIBRILLATION, UNSPECIFIED TYPE (HCC): ICD-10-CM

## 2018-05-15 LAB
INR BLD: 2.4
PROTIME: 27.7 SECONDS

## 2018-05-15 PROCEDURE — 85610 PROTHROMBIN TIME: CPT

## 2018-05-15 PROCEDURE — 99211 OFF/OP EST MAY X REQ PHY/QHP: CPT

## 2018-05-25 ENCOUNTER — HOSPITAL ENCOUNTER (OUTPATIENT)
Dept: CT IMAGING | Age: 83
Discharge: HOME | End: 2018-05-27
Payer: MEDICARE

## 2018-05-25 VITALS
HEIGHT: 73 IN | HEART RATE: 85 BPM | BODY MASS INDEX: 24.25 KG/M2 | WEIGHT: 183 LBS | DIASTOLIC BLOOD PRESSURE: 74 MMHG | SYSTOLIC BLOOD PRESSURE: 118 MMHG | RESPIRATION RATE: 16 BRPM

## 2018-05-25 DIAGNOSIS — G91.2 (IDIOPATHIC) NORMAL PRESSURE HYDROCEPHALUS (HCC): ICD-10-CM

## 2018-05-25 PROCEDURE — 70450 CT HEAD/BRAIN W/O DYE: CPT

## 2018-06-12 ENCOUNTER — HOSPITAL ENCOUNTER (OUTPATIENT)
Dept: PHARMACY | Age: 83
Setting detail: THERAPIES SERIES
Discharge: HOME | End: 2018-06-12
Payer: MEDICARE

## 2018-06-12 DIAGNOSIS — I48.91 ATRIAL FIBRILLATION, UNSPECIFIED TYPE (HCC): ICD-10-CM

## 2018-06-12 LAB
INR BLD: 2.4
PROTIME: 29 SECONDS

## 2018-06-12 PROCEDURE — 99211 OFF/OP EST MAY X REQ PHY/QHP: CPT

## 2018-06-12 PROCEDURE — 85610 PROTHROMBIN TIME: CPT

## 2018-07-10 ENCOUNTER — HOSPITAL ENCOUNTER (OUTPATIENT)
Dept: PHARMACY | Age: 83
Setting detail: THERAPIES SERIES
Discharge: HOME | End: 2018-07-10
Payer: MEDICARE

## 2018-07-10 DIAGNOSIS — I48.91 ATRIAL FIBRILLATION, UNSPECIFIED TYPE (HCC): ICD-10-CM

## 2018-07-10 LAB
INR BLD: 2.3
PROTIME: 27.2 SECONDS

## 2018-07-10 PROCEDURE — 85610 PROTHROMBIN TIME: CPT | Performed by: PHARMACIST

## 2018-07-10 PROCEDURE — 99211 OFF/OP EST MAY X REQ PHY/QHP: CPT | Performed by: PHARMACIST

## 2018-07-13 ENCOUNTER — HOSPITAL ENCOUNTER (OUTPATIENT)
Dept: CARDIOLOGY | Age: 83
Discharge: HOME | End: 2018-07-13
Payer: MEDICARE

## 2018-07-13 PROCEDURE — 93279 PRGRMG DEV EVAL PM/LDLS PM: CPT

## 2018-08-07 ENCOUNTER — HOSPITAL ENCOUNTER (OUTPATIENT)
Dept: PHARMACY | Age: 83
Setting detail: THERAPIES SERIES
Discharge: HOME | End: 2018-08-07
Payer: MEDICARE

## 2018-08-07 DIAGNOSIS — I48.91 ATRIAL FIBRILLATION, UNSPECIFIED TYPE (HCC): ICD-10-CM

## 2018-08-07 LAB
INR BLD: 2.5
PROTIME: 30 SECONDS

## 2018-08-07 PROCEDURE — 99211 OFF/OP EST MAY X REQ PHY/QHP: CPT | Performed by: PHARMACIST

## 2018-08-07 PROCEDURE — 85610 PROTHROMBIN TIME: CPT | Performed by: PHARMACIST

## 2018-08-08 ENCOUNTER — HOSPITAL ENCOUNTER (OUTPATIENT)
Dept: PHYSICAL THERAPY | Age: 83
Setting detail: THERAPIES SERIES
Discharge: HOME | End: 2018-08-08
Payer: MEDICARE

## 2018-08-08 PROCEDURE — G8979 MOBILITY GOAL STATUS: HCPCS

## 2018-08-08 PROCEDURE — 97162 PT EVAL MOD COMPLEX 30 MIN: CPT

## 2018-08-08 PROCEDURE — G8978 MOBILITY CURRENT STATUS: HCPCS

## 2018-08-08 NOTE — PROGRESS NOTES
[address]  PHYSICAL THERAPY EVALUATION    Date: 2018  Patient Name: Emilia Love       MRN: [id number]   Account: [de-identified]   : 1935  (81 y.o.)   Gender: male   Referring Practitioner: Dr. Rancho Cifuentes                 Diagnosis: Ataxia  Treatment Diagnosis: Unsteadiness on feet  Additional Pertinent Hx: Afib with PPM, Alzheimer's, OA             Past Medical History:  has a past medical history of Annual physical exam; Atrial fibrillation ([address] Benign prostatic hypertrophy without urinary obstruction; Gout; History of colonoscopy; Hypertension; Hyperthyroidism; Osteoarthritis; Pacemaker; and Sinoatrial node dysfunction ([address] Past Surgical History:   has a past surgical history that includes pacemaker placement (); Abdomen surgery; hernia repair (Left, 13); Total knee arthroplasty (Right, ); cystourethroscopy (14); Upper gastrointestinal endoscopy (); Colonoscopy (); and Cardiac surgery (Left, 2016). Vital Signs  Patient Currently in Pain: No   Pain Screening  Patient Currently in Pain: No          Lives With: Spouse  Type of Home: House  Home Layout: Multi-level  Home Access: Stairs to enter with rails  Entrance Stairs - Number of Steps: 1  Entrance Stairs - Rails: Both  Occupation: Retired        Subjective:  Subjective: Doctor ordered PT to improve pt's balance. Pt's wife reports pt has been having more difficulty going down steps the last couple of days - goes up ok and unsure if due to pt's vision. [facility] [address] in February and had home PT come out for a while. Began progressively weak in February - has gotten better but not completely better. Pt's wife reports pt off balance sometimes right after he gets up and is off balance with walking. Has dementia and has some issues with short term memory - difficulty answering how he has been doing and often asks his wife for answers.        Objective:   Sensation  Overall Sensation
stability with ambulation and difficulty with transfers from a lower surface. Pt is at an increased risk for falls per Artice Close and is unsteady with DGI tasks. Pt is limited by memory deficits and difficulty with understanding instructions for tasks. Pt would benefit from further skilled PT to improve strength, balance and gait to reduce risk for falls. Prognosis: Good  Discharge Recommendations: Continue to assess pending progress    G-Codes  PT G-Codes  Functional Assessment Tool Used: Teague, DGI and clinical judgement  Score: 39/56, 10/24  Functional Limitation: Mobility: Walking and moving around  Mobility: Walking and Moving Around Current Status (): At least 40 percent but less than 60 percent impaired, limited or restricted  Mobility: Walking and Moving Around Goal Status (): At least 20 percent but less than 40 percent impaired, limited or restricted    PLAN: [x] Evaluate and Treat  Frequency/Duration:  Plan  Times per week: 2  Plan weeks: 4-5  Current Treatment Recommendations: Strengthening, Balance Training, Functional Mobility Training, Transfer Training, Gait Training, Stair training, Neuromuscular Re-education, Manual Therapy - Soft Tissue Mobilization, Home Exercise Program, Patient/Caregiver Education & Training, Equipment Evaluation, Education, & procurement, Modalities     Patient Status:[x] Continue/ Initiate plan of Care    [] Discharge PT. Recommend pt continue with HEP. [] Additional visits requested, Please re-certify for additional visits:          Signature: Electronically signed by Rosa M Davalos PT on 8/8/18 at 3:16 PM      If you have any questions or concerns, please don't hesitate to call. Thank you for your referral.    I have reviewed this plan of care and certify a need for medically necessary rehabilitation services.     Physician Signature:__________________________________________________________  Date:  Please sign and return

## 2018-08-09 ENCOUNTER — APPOINTMENT (OUTPATIENT)
Dept: GENERAL RADIOLOGY | Age: 83
End: 2018-08-09
Payer: MEDICARE

## 2018-08-09 ENCOUNTER — HOSPITAL ENCOUNTER (EMERGENCY)
Age: 83
Discharge: HOME | End: 2018-08-09
Attending: EMERGENCY MEDICINE
Payer: MEDICARE

## 2018-08-09 VITALS
OXYGEN SATURATION: 100 % | RESPIRATION RATE: 10 BRPM | HEART RATE: 71 BPM | BODY MASS INDEX: 23.19 KG/M2 | HEIGHT: 73 IN | WEIGHT: 175 LBS | SYSTOLIC BLOOD PRESSURE: 129 MMHG | DIASTOLIC BLOOD PRESSURE: 91 MMHG | TEMPERATURE: 98.8 F

## 2018-08-09 DIAGNOSIS — J18.9 PNEUMONIA DUE TO ORGANISM: ICD-10-CM

## 2018-08-09 DIAGNOSIS — R53.1 GENERAL WEAKNESS: Primary | ICD-10-CM

## 2018-08-09 DIAGNOSIS — N30.00 ACUTE CYSTITIS WITHOUT HEMATURIA: ICD-10-CM

## 2018-08-09 LAB
ALBUMIN SERPL-MCNC: 3.5 G/DL (ref 3.9–4.9)
ALP BLD-CCNC: 61 U/L (ref 35–104)
ALT SERPL-CCNC: 24 U/L (ref 0–41)
ANION GAP SERPL CALCULATED.3IONS-SCNC: 11 MEQ/L (ref 7–13)
AST SERPL-CCNC: 23 U/L (ref 0–40)
BACTERIA: ABNORMAL /HPF
BASOPHILS ABSOLUTE: 0 K/UL (ref 0–0.2)
BASOPHILS RELATIVE PERCENT: 0.3 %
BILIRUB SERPL-MCNC: 0.5 MG/DL (ref 0–1.2)
BILIRUBIN URINE: NEGATIVE
BLOOD, URINE: ABNORMAL
BUN BLDV-MCNC: 16 MG/DL (ref 8–23)
CALCIUM SERPL-MCNC: 9 MG/DL (ref 8.6–10.2)
CHLORIDE BLD-SCNC: 97 MEQ/L (ref 98–107)
CLARITY: ABNORMAL
CO2: 28 MEQ/L (ref 22–29)
COLOR: YELLOW
CREAT SERPL-MCNC: 0.91 MG/DL (ref 0.7–1.2)
EOSINOPHILS ABSOLUTE: 0.2 K/UL (ref 0–0.7)
EOSINOPHILS RELATIVE PERCENT: 2.6 %
GFR AFRICAN AMERICAN: >60
GFR NON-AFRICAN AMERICAN: >60
GLOBULIN: 3.2 G/DL (ref 2.3–3.5)
GLUCOSE BLD-MCNC: 104 MG/DL (ref 74–109)
GLUCOSE URINE: NEGATIVE MG/DL
HCT VFR BLD CALC: 35.1 % (ref 42–52)
HEMOGLOBIN: 12.4 G/DL (ref 14–18)
KETONES, URINE: NEGATIVE MG/DL
LEUKOCYTE ESTERASE, URINE: ABNORMAL
LYMPHOCYTES ABSOLUTE: 1.3 K/UL (ref 1–4.8)
LYMPHOCYTES RELATIVE PERCENT: 16.9 %
MAGNESIUM: 2 MG/DL (ref 1.7–2.3)
MCH RBC QN AUTO: 32.7 PG (ref 27–31.3)
MCHC RBC AUTO-ENTMCNC: 35.2 % (ref 33–37)
MCV RBC AUTO: 92.7 FL (ref 80–100)
MONOCYTES ABSOLUTE: 0.7 K/UL (ref 0.2–0.8)
MONOCYTES RELATIVE PERCENT: 8.8 %
NEUTROPHILS ABSOLUTE: 5.4 K/UL (ref 1.4–6.5)
NEUTROPHILS RELATIVE PERCENT: 71.4 %
NITRITE, URINE: NEGATIVE
PDW BLD-RTO: 13.2 % (ref 11.5–14.5)
PH UA: 8 (ref 5–9)
PLATELET # BLD: 209 K/UL (ref 130–400)
POTASSIUM SERPL-SCNC: 4 MEQ/L (ref 3.5–5.1)
PROTEIN UA: 30 MG/DL
RBC # BLD: 3.79 M/UL (ref 4.7–6.1)
RBC UA: ABNORMAL /HPF (ref 0–2)
SODIUM BLD-SCNC: 136 MEQ/L (ref 132–144)
SPECIFIC GRAVITY UA: 1.01 (ref 1–1.03)
TOTAL PROTEIN: 6.7 G/DL (ref 6.4–8.1)
TROPONIN: <0.01 NG/ML (ref 0–0.01)
UROBILINOGEN, URINE: 0.2 E.U./DL
WBC # BLD: 7.6 K/UL (ref 4.8–10.8)
WBC UA: ABNORMAL /HPF (ref 0–5)

## 2018-08-09 PROCEDURE — 36415 COLL VENOUS BLD VENIPUNCTURE: CPT

## 2018-08-09 PROCEDURE — 74018 RADEX ABDOMEN 1 VIEW: CPT

## 2018-08-09 PROCEDURE — 87040 BLOOD CULTURE FOR BACTERIA: CPT

## 2018-08-09 PROCEDURE — 2580000003 HC RX 258: Performed by: EMERGENCY MEDICINE

## 2018-08-09 PROCEDURE — 84484 ASSAY OF TROPONIN QUANT: CPT

## 2018-08-09 PROCEDURE — 81001 URINALYSIS AUTO W/SCOPE: CPT

## 2018-08-09 PROCEDURE — 87077 CULTURE AEROBIC IDENTIFY: CPT

## 2018-08-09 PROCEDURE — 99284 EMERGENCY DEPT VISIT MOD MDM: CPT

## 2018-08-09 PROCEDURE — 6360000002 HC RX W HCPCS: Performed by: EMERGENCY MEDICINE

## 2018-08-09 PROCEDURE — 83735 ASSAY OF MAGNESIUM: CPT

## 2018-08-09 PROCEDURE — 80053 COMPREHEN METABOLIC PANEL: CPT

## 2018-08-09 PROCEDURE — 85025 COMPLETE CBC W/AUTO DIFF WBC: CPT

## 2018-08-09 PROCEDURE — 96365 THER/PROPH/DIAG IV INF INIT: CPT

## 2018-08-09 PROCEDURE — 87149 DNA/RNA DIRECT PROBE: CPT

## 2018-08-09 PROCEDURE — 71046 X-RAY EXAM CHEST 2 VIEWS: CPT

## 2018-08-09 RX ORDER — LEVOFLOXACIN 5 MG/ML
750 INJECTION, SOLUTION INTRAVENOUS ONCE
Status: COMPLETED | OUTPATIENT
Start: 2018-08-09 | End: 2018-08-09

## 2018-08-09 RX ORDER — 0.9 % SODIUM CHLORIDE 0.9 %
500 INTRAVENOUS SOLUTION INTRAVENOUS ONCE
Status: COMPLETED | OUTPATIENT
Start: 2018-08-09 | End: 2018-08-09

## 2018-08-09 RX ORDER — LEVOFLOXACIN 750 MG/1
750 TABLET ORAL DAILY
Qty: 10 TABLET | Refills: 0 | Status: SHIPPED | OUTPATIENT
Start: 2018-08-09 | End: 2018-08-19

## 2018-08-09 RX ADMIN — SODIUM CHLORIDE 500 ML: 9 INJECTION, SOLUTION INTRAVENOUS at 14:05

## 2018-08-09 RX ADMIN — LEVOFLOXACIN 750 MG: 5 INJECTION, SOLUTION INTRAVENOUS at 16:06

## 2018-08-09 ASSESSMENT — ENCOUNTER SYMPTOMS
COUGH: 1
NAUSEA: 0
DIARRHEA: 0
RHINORRHEA: 1
VOMITING: 0

## 2018-08-09 NOTE — ED PROVIDER NOTES
[address] [facility] ED  eMERGENCY dEPARTMENT eNCOUnter      Pt Name: Nasra Frank  MRN: [id number]  [place] 1935  Date of evaluation: 8/9/2018  Provider: Pedro Watts MD    CHIEF COMPLAINT           HPI  Nasra Frank is a 81 y.o. male per chart review has a h/o Afib (on coumadin), HTN, Hyperlipidemia, Pacemaker, OA, and alzheimers who presents w/ c/o weakness, cough, and nasal drainage. As per Pt's family pt started with cough, and nasal drainage, then pt would not eat, and today pt had a HARGROVE at lunch. Pt denies fever, nausea, vomiting and diarrhea. Pt denies HARGROVE at time of exam.  ROS  Review of Systems   Constitutional: Positive for appetite change and fatigue. Negative for chills and fever. HENT: Positive for rhinorrhea. Respiratory: Positive for cough. Gastrointestinal: Negative for diarrhea, nausea and vomiting. Neurological: Positive for weakness. Except as noted above the remainder of the review of systems was reviewed and negative.        PAST MEDICAL HISTORY     Past Medical History:   Diagnosis Date    Annual physical exam 10/27/2011    Atrial fibrillation ([address]     Benign prostatic hypertrophy without urinary obstruction     Gout     History of colonoscopy 11/7/12 & 6/9/2005    Ashley Ham    Hypertension     Hyperthyroidism     Osteoarthritis     Pacemaker     Sinoatrial node dysfunction [facility])          SURGICAL HISTORY       Past Surgical History:   Procedure Laterality Date    ABDOMEN SURGERY      CARDIAC SURGERY Left 07/14/2016    L ARM VENOGRAM-PIN PLACEMENT IN ATRIAL PORT    COLONOSCOPY  2012    Dr Ashley Ham    CYSTOURETHROSCOPY  11/05/14    HERNIA REPAIR Left 7/16/13    Repair of recurrent LIH    PACEMAKER PLACEMENT  4/16 6/16    TOTAL KNEE ARTHROPLASTY Right 2014    UPPER GASTROINTESTINAL ENDOSCOPY  2012    Dr Peg Pina       Previous Medications    ALLOPURINOL (ZYLOPRIM) 300 MG TABLET    TAKE ONE-HALF (1/2) TABLET DAILY CALCIUM CARBONATE-VITAMIN D (CALCIUM 500 + D PO)    Take 1 tablet by mouth daily. CHOLECALCIFEROL (VITAMIN D3) 2000 UNITS CAPS    Take 1,000 mg by mouth daily. FINASTERIDE (PROSCAR) 5 MG TABLET    Take 1 tablet by mouth daily    FISH OIL OIL    by Does not apply route 360 mg one capsule daily    GLUCOSAMINE-CHONDROITIN 500-400 MG CAPS    Take 1 capsule by mouth daily. MAGNESIUM OXIDE (MAG-OX) 400 MG TABLET    Take 400 mg by mouth daily    METOPROLOL (LOPRESSOR) 25 MG TABLET    Take 12.5 mg by mouth 2 times daily     MULTIPLE VITAMIN (TAB-A-YVETTE PO)    Take 1 tablet by mouth daily. PAROXETINE (PAXIL) 10 MG TABLET    Take 10 mg by mouth every morning    QUETIAPINE (SEROQUEL) 25 MG TABLET    Take 50 mg by mouth 2 times daily     RISPERIDONE (RISPERDAL) 0.5 MG TABLET    Take 0.5 mg by mouth every evening     SIMVASTATIN (ZOCOR) 20 MG TABLET    Take 1 tablet by mouth nightly. WARFARIN (COUMADIN) 1 MG TABLET    Take as directed by [facility]. Quantity equals 90 day supply.        ALLERGIES     Adhesive tape and Rivastigmine    FAMILY HISTORY       Family History   Problem Relation Age of Onset    Cancer Mother         ovarian    Sudden Death Father     Cancer Sister     Cancer Brother           SOCIAL HISTORY       Social History     Social History    Marital status:      Spouse name: N/A    Number of children: N/A    Years of education: N/A     Social History Main Topics    Smoking status: Never Smoker    Smokeless tobacco: Never Used    Alcohol use No    Drug use: No    Sexual activity: Not Asked     Other Topics Concern    None     Social History Narrative    ADLs and IADLs intact                 PHYSICAL EXAM       ED Triage Vitals [08/09/18 1245]   BP Temp Temp Source Pulse Resp SpO2 Height Weight   (!) 82/59 99.6 °F (37.6 °C) Tympanic 76 20 98 % 6' 1\" (1.854 m) 175 lb (79.4 kg)       Physical Exam   Constitutional: He is oriented to person,

## 2018-08-09 NOTE — ED NOTES
Rounded with family while patient is in xray. No needs at this time.       Lesli Garcia, NA  08/09/18 7302

## 2018-08-09 NOTE — ED TRIAGE NOTES
Pt family reports that he became weak today while at a family event has been sleeping often and not wanting to eat or drink.  Pt is pleasantly confused at times hx dementia pt reports that he has had a headache but at this time denies any pain pt was assisted out of the car by ER staff respirations even and non labored skin WNL

## 2018-08-12 LAB
CULTURE, BLOOD 2: ABNORMAL
ORGANISM: ABNORMAL
ORGANISM: ABNORMAL

## 2018-08-14 LAB — BLOOD CULTURE, ROUTINE: NORMAL

## 2018-08-20 ENCOUNTER — HOSPITAL ENCOUNTER (OUTPATIENT)
Dept: PHYSICAL THERAPY | Age: 83
Setting detail: THERAPIES SERIES
Discharge: HOME | End: 2018-08-20
Payer: MEDICARE

## 2018-08-20 PROCEDURE — 97110 THERAPEUTIC EXERCISES: CPT

## 2018-08-20 PROCEDURE — 97112 NEUROMUSCULAR REEDUCATION: CPT

## 2018-08-20 NOTE — PROGRESS NOTES
Decreased coordination, Decreased vision/visual deficit  Assessment: Initiated strengthening and balance activities to improve strength and mobility. Pt with difficulty completing S/L Hip abduction as well as SLR with quad lag observed. Frequent VCs and hand over hand  with directions to complete tasks d/t cognitive deficits. Good endurance throughout tx. Goals:       Long term goals  Time Frame for Long term goals : 4-5 weeks  Long term goal 1: Improve albin LE strength to >/= 4+/5 to improve stability with standing and walking. Long term goal 2: Pt will ambulate without an AD >/= 200' with improved speed and quality S/I. Long term goal 3: Teague >/= 45/56 and DGI >/= 18/24 to reduce risk for falls. Long term goal 4: 5xSTS </= 12sec without UEs from chair to improve safety and increase ease with transfers. Progress toward goals: Balance, strength    POST-PAIN       Pain Rating (0-10 pain scale):   0/10   Location and pain description same as pre-treatment unless indicated. Action: [x] NA   [] Perform HEP  [] Meds as prescribed  [] Modalities as prescribed   [] Call Physician     Frequency/Duration:  Plan  Times per week: 2  Plan weeks: 4-5  Current Treatment Recommendations: Strengthening, Balance Training, Functional Mobility Training, Transfer Training, Gait Training, Stair training, Neuromuscular Re-education, Manual Therapy - Soft Tissue Mobilization, Home Exercise Program, Patient/Caregiver Education & Training, Equipment Evaluation, Education, & procurement, Modalities     Pt to continue current HEP. See objective section for any therapeutic exercise changes, additions or modifications this date.          PT Individual Minutes  Time In: 4447  Time Out: 7212  Minutes: 57  Timed Code Treatment Minutes: 56 Minutes  Procedure Minutes: 0  There ex: 30  Neuro: 26    Signature:  Electronically signed by See Le PTA on 8/20/18 at 4:10 PM

## 2018-08-24 ENCOUNTER — HOSPITAL ENCOUNTER (OUTPATIENT)
Dept: PHYSICAL THERAPY | Age: 83
Setting detail: THERAPIES SERIES
Discharge: HOME | End: 2018-08-24
Payer: MEDICARE

## 2018-08-24 PROCEDURE — 97112 NEUROMUSCULAR REEDUCATION: CPT

## 2018-08-24 NOTE — PROGRESS NOTES
quality S/I. Long term goal 3: Teague >/= 45/56 and DGI >/= 18/24 to reduce risk for falls. Long term goal 4: 5xSTS </= 12sec without UEs from chair to improve safety and increase ease with transfers. Progress toward goals:LTG 2,3    POST-PAIN       Pain Rating (0-10 pain scale): 0  /10   Location and pain description same as pre-treatment unless indicated. Action: [x] NA   [] Perform HEP  [] Meds as prescribed  [] Modalities as prescribed   [] Call Physician     Frequency/Duration:  Plan  Times per week: 2  Plan weeks: 4-5  Current Treatment Recommendations: Strengthening, Balance Training, Functional Mobility Training, Transfer Training, Gait Training, Stair training, Neuromuscular Re-education, Manual Therapy - Soft Tissue Mobilization, Home Exercise Program, Patient/Caregiver Education & Training, Equipment Evaluation, Education, & procurement, Modalities     Pt to continue current HEP. See objective section for any therapeutic exercise changes, additions or modifications this date.          PT Individual Minutes  Time In: 5506  Time Out: 1600  Minutes: 56  Timed Code Treatment Minutes: 56 Minutes  Procedure Minutes:0    Signature:  Electronically signed by Xavi Lu PTA on 8/24/18 at 4:07 PM

## 2018-08-27 ENCOUNTER — HOSPITAL ENCOUNTER (OUTPATIENT)
Dept: PHYSICAL THERAPY | Age: 83
Setting detail: THERAPIES SERIES
Discharge: HOME | End: 2018-08-27
Payer: MEDICARE

## 2018-08-27 PROCEDURE — 97112 NEUROMUSCULAR REEDUCATION: CPT

## 2018-08-27 PROCEDURE — 97116 GAIT TRAINING THERAPY: CPT

## 2018-08-27 PROCEDURE — 97535 SELF CARE MNGMENT TRAINING: CPT

## 2018-08-27 NOTE — PROGRESS NOTES
[address] [address]  Outpatient Physical Therapy    Treatment Note        Date: 2018  Patient: Khushboo Stewart  : 1935  ACCT #: [de-identified]  Referring Practitioner: Dr. Haroon White  Diagnosis: Ataxia    Visit Information:  PT Visit Information  PT Insurance Information: Medicare  Total # of Visits Approved:  (Subject to therapy cap)  Total # of Visits to Date: 3  Plan of Care/Certification Expiration Date: 10/17/18  No Show: 0  Canceled Appointment: 0  Progress Note Counter: 3/10    Subjective: patient's wife reports he has difficulty getting out of bed in the morning. Notes he is unable to roll due to back pain. \"This morning we had a friend over and he had to help him out of bed. \"       HEP Compliance:  [x] Good [] Fair [] Poor [] Reports not doing due to:    Vital Signs  Patient Currently in Pain: No   Pain Screening  Patient Currently in Pain: No    OBJECTIVE:   Exercises  Exercise 4: Foam FA/Ft  holding ball forward reach, double arm raise, trunk rotation   Exercise 5: lateral steps   Exercise 6: sit to stand x10 without UE from chair   Exercise 7: log rolling x5   Exercise 8: nustep 5' level 3   Exercise 12: ambulate with spotting  Ambulation 1  Surface: carpet  Device: No Device  Assistance: Supervision, Independent  Quality of Gait: decreased racheal, reciprocal, decreased bilateral foot clearance. patient demonstrates significant decrease in speed with distraction. able to improve with cuing however poor carryover long term. Distance: 2 laps around track   Assessment: Body structures, Functions, Activity limitations: Decreased functional mobility , Decreased strength, Decreased balance, Decreased coordination, Decreased vision/visual deficit  Assessment: initiated log rolling to improve ease with bed mobility. patient performed several reps at CGA-min A and max cuing. educated wife on technique to decrease assistance required at home.  patient has poor carryover with all activities due to memory deficits. Goals:  Long term goals  Time Frame for Long term goals : 4-5 weeks  Long term goal 1: Improve albin LE strength to >/= 4+/5 to improve stability with standing and walking. Long term goal 2: Pt will ambulate without an AD >/= 200' with improved speed and quality S/I. Long term goal 3: Teague >/= 45/56 and DGI >/= 18/24 to reduce risk for falls. Long term goal 4: 5xSTS </= 12sec without UEs from chair to improve safety and increase ease with transfers. Progress toward goals:continue towards all   POST-PAIN       Pain Rating (0-10 pain scale):   0/10   Location and pain description same as pre-treatment unless indicated. Action: [] NA   [] Perform HEP  [] Meds as prescribed  [] Modalities as prescribed   [] Call Physician     Frequency/Duration:  Plan  Times per week: 2  Plan weeks: 4-5  Current Treatment Recommendations: Strengthening, Balance Training, Functional Mobility Training, Transfer Training, Gait Training, Stair training, Neuromuscular Re-education, Manual Therapy - Soft Tissue Mobilization, Home Exercise Program, Patient/Caregiver Education & Training, Equipment Evaluation, Education, & procurement, Modalities     Pt to continue current HEP. See objective section for any therapeutic exercise changes, additions or modifications this date.          PT Individual Minutes  Time In: 1400  Time Out: 1500  Minutes: 60  Timed Code Treatment Minutes: 60 Minutes  Procedure Minutes:0    Signature:  Electronically signed by Talbert Aschoff, PTA on 8/27/18 at 3:48 PM

## 2018-08-31 ENCOUNTER — HOSPITAL ENCOUNTER (OUTPATIENT)
Dept: PHYSICAL THERAPY | Age: 83
Setting detail: THERAPIES SERIES
Discharge: HOME | End: 2018-08-31
Payer: MEDICARE

## 2018-08-31 PROCEDURE — 97535 SELF CARE MNGMENT TRAINING: CPT

## 2018-08-31 PROCEDURE — 97112 NEUROMUSCULAR REEDUCATION: CPT

## 2018-09-04 ENCOUNTER — HOSPITAL ENCOUNTER (OUTPATIENT)
Dept: PHYSICAL THERAPY | Age: 83
Setting detail: THERAPIES SERIES
Discharge: HOME | End: 2018-09-04
Payer: MEDICARE

## 2018-09-04 ENCOUNTER — HOSPITAL ENCOUNTER (OUTPATIENT)
Dept: PHARMACY | Age: 83
Setting detail: THERAPIES SERIES
Discharge: HOME | End: 2018-09-04
Payer: MEDICARE

## 2018-09-04 DIAGNOSIS — I48.91 ATRIAL FIBRILLATION, UNSPECIFIED TYPE (HCC): ICD-10-CM

## 2018-09-04 LAB
INR BLD: 2.6
PROTIME: 30.9 SECONDS

## 2018-09-04 PROCEDURE — 97112 NEUROMUSCULAR REEDUCATION: CPT

## 2018-09-04 PROCEDURE — 99211 OFF/OP EST MAY X REQ PHY/QHP: CPT

## 2018-09-04 PROCEDURE — 85610 PROTHROMBIN TIME: CPT

## 2018-09-04 PROCEDURE — 97110 THERAPEUTIC EXERCISES: CPT

## 2018-09-04 PROCEDURE — 97116 GAIT TRAINING THERAPY: CPT

## 2018-09-04 RX ORDER — WARFARIN SODIUM 1 MG/1
TABLET ORAL
Qty: 100 TABLET | Refills: 1 | Status: SHIPPED | OUTPATIENT
Start: 2018-09-04 | End: 2019-03-27 | Stop reason: SDUPTHER

## 2018-09-07 ENCOUNTER — HOSPITAL ENCOUNTER (OUTPATIENT)
Dept: PHYSICAL THERAPY | Age: 83
Setting detail: THERAPIES SERIES
Discharge: HOME | End: 2018-09-07
Payer: MEDICARE

## 2018-09-07 PROCEDURE — 97535 SELF CARE MNGMENT TRAINING: CPT

## 2018-09-07 PROCEDURE — 97112 NEUROMUSCULAR REEDUCATION: CPT

## 2018-09-07 NOTE — PROGRESS NOTES
[address] [address]  Outpatient Physical Therapy    Treatment Note        Date: 2018  Patient: Kane Hall  : 1935  ACCT #: [de-identified]  Referring Practitioner: Dr. Connie Lugo  Diagnosis: Ataxia    Visit Information:  PT Visit Information  PT Insurance Information: Medicare  Total # of Visits Approved:  (Subject to therapy cap)  Total # of Visits to Date: 6  Plan of Care/Certification Expiration Date: 10/17/18  No Show: 0  Canceled Appointment: 0  Progress Note Counter: 6/10    Subjective: Pt's wife reports her and daughters had a hard time trying to et pt to follow ex's yesterday. HEP Compliance:  [x] Good [x] Fair [] Poor [] Reports not doing due to:    Vital Signs  Patient Currently in Pain: No   Pain Screening  Patient Currently in Pain: No    OBJECTIVE:   Exercises  Exercise 6: sit to stand x5  low chair without UEs , 1 post lean  Exercise 8: nustep 6' level 3   Exercise 10: STS from mat without UEs x10 without difficulty  Exercise 11: HS stretch on stool to increase flexibility 3/30'' (heavy cues for technique)  Exercise 14: ambulation with gait ladder with increased repetition to understand concept and complete longer steps with CGA, F/L  Exercise 15: ambulation carrying cones on sliding board with VCs for upright and prevent cones falling , ball on cone  Exercise 17: reaching to touch numbers with ball #1-10 with SBA , attempted foam with confusion       *Indicates exercise, modality, or manual techniques to be initiated when appropriate    Assessment: Body structures, Functions, Activity limitations: Decreased functional mobility , Decreased strength, Decreased balance, Decreased coordination, Decreased vision/visual deficit  Assessment: Pt able to perform STS from chair without ue's, 1 posterior lean corrected with VCs. Pt required cueing throughout for technique and carryover. Pt demo's difficulty with cooridination  skills.   Treatment Diagnosis: Unsteadiness on feet Goals:       Long term goals  Time Frame for Long term goals : 4-5 weeks  Long term goal 1: Improve albin LE strength to >/= 4+/5 to improve stability with standing and walking. Long term goal 2: Pt will ambulate without an AD >/= 200' with improved speed and quality S/I. Long term goal 3: Teague >/= 45/56 and DGI >/= 18/24 to reduce risk for falls. Long term goal 4: 5xSTS </= 12sec without UEs from chair to improve safety and increase ease with transfers. Progress toward goals:transfers, strength, balance    POST-PAIN       Pain Rating (0-10 pain scale):  0 /10   Location and pain description same as pre-treatment unless indicated. Action: [] NA   [x] Perform HEP  [] Meds as prescribed  [x] Modalities as prescribed   [] Call Physician     Frequency/Duration:  Plan  Times per week: 2  Plan weeks: 4-5  Current Treatment Recommendations: Strengthening, Balance Training, Functional Mobility Training, Transfer Training, Gait Training, Stair training, Neuromuscular Re-education, Manual Therapy - Soft Tissue Mobilization, Home Exercise Program, Patient/Caregiver Education & Training, Equipment Evaluation, Education, & procurement, Modalities     Pt to continue current HEP. See objective section for any therapeutic exercise changes, additions or modifications this date.          PT Individual Minutes  Time In: 7521  Time Out: 7494  Minutes: 60       Signature:  Electronically signed by Cristi Connor PTA on 18 at 4:07 PM      Total Minutes:60      Transfer/Bed mobility trainin      Gait training:      Neuro re education:55     Therapeutic ex:

## 2018-09-10 ENCOUNTER — HOSPITAL ENCOUNTER (OUTPATIENT)
Dept: PHYSICAL THERAPY | Age: 83
Setting detail: THERAPIES SERIES
Discharge: HOME | End: 2018-09-10
Payer: MEDICARE

## 2018-09-10 PROCEDURE — 97112 NEUROMUSCULAR REEDUCATION: CPT

## 2018-09-10 PROCEDURE — 97110 THERAPEUTIC EXERCISES: CPT

## 2018-09-11 ENCOUNTER — HOSPITAL ENCOUNTER (OUTPATIENT)
Dept: WOMENS IMAGING | Age: 83
Discharge: HOME | End: 2018-09-13
Payer: MEDICARE

## 2018-09-11 ENCOUNTER — HOSPITAL ENCOUNTER (OUTPATIENT)
Dept: ULTRASOUND IMAGING | Age: 83
Discharge: HOME | End: 2018-09-13
Payer: MEDICARE

## 2018-09-11 DIAGNOSIS — N64.4 BREAST TENDERNESS: ICD-10-CM

## 2018-09-11 PROCEDURE — 76642 ULTRASOUND BREAST LIMITED: CPT

## 2018-09-11 PROCEDURE — 77066 DX MAMMO INCL CAD BI: CPT

## 2018-09-14 ENCOUNTER — HOSPITAL ENCOUNTER (OUTPATIENT)
Dept: PHYSICAL THERAPY | Age: 83
Setting detail: THERAPIES SERIES
Discharge: HOME | End: 2018-09-14
Payer: MEDICARE

## 2018-09-14 PROCEDURE — 97116 GAIT TRAINING THERAPY: CPT

## 2018-09-14 PROCEDURE — 97112 NEUROMUSCULAR REEDUCATION: CPT

## 2018-09-14 NOTE — PROGRESS NOTES
[address] [address]  Outpatient Physical Therapy    Treatment Note        Date: 2018  Patient: Consuelo Corbett  : 1935  ACCT #: [de-identified]  Referring Practitioner: Dr. Ángela Kevin  Diagnosis: Ataxia    Visit Information:  PT Visit Information  PT Insurance Information: Medicare  Total # of Visits Approved:  (Subject to therapy cap)  Total # of Visits to Date: 8  Plan of Care/Certification Expiration Date: 10/17/18  No Show: 0  Canceled Appointment: 0  Progress Note Counter: 8/10    Subjective: Pts wife reports not much compliance with HEP, however they went to [employer] exercise group yesterday and pt performed seated ex's     HEP Compliance:  [x] Good [x] Fair [] Poor [] Reports not doing due to:    Vital Signs  Patient Currently in Pain: No   Pain Screening  Patient Currently in Pain: No    OBJECTIVE:   Exercises  Exercise 2: LOS - anterior/posterior, rhythmic stabilization, tuning board  Exercise 3: tuning board ; with wt shifts 1-2 HH support  Exercise 4: foam with ball , lifts and chops, difficulty following  Exercise 10: STS from chair , NDT, 4\" x 3 albin  Exercise 15: Dual tasking: tossing ball to self, tossing to therapist with gait  Exercise 17: 4 sqwuare stepping with difficulty following directions   Exercise 19: Teague= 48/56     Ambulation 1  Surface: level tile, carpet  Device: No Device  Assistance: Supervision, Independent  Quality of Gait: VCs for increased step length and upright posture, foot clearance and speed  Distance: 2 laps on track  Stairs  # Steps : 4  Stairs Height: 6\"  Device: No Device  Assistance: Stand by assistance, Supervision  Comment: Reciprocal up, non-reciprocal down. VCs for appropriate foot placement , recip  x1 step    Transfers  Comment: STS x 5 with/without ue's =22.70      Strength: [x] NT  [] MMT completed:     *Indicates exercise, modality, or manual techniques to be initiated when appropriate    Assessment:         Body structures, Functions, Activity limitations: Decreased functional mobility , Decreased strength, Decreased balance, Decreased coordination, Decreased vision/visual deficit  Assessment: Pt odell's improved Balance per Teague score, up by 9 points, however continues to need HOHA and cueing for activites. Pt odell'd improved STS time from chair this date, after initiating NDT STS for strengthening. Initiated tuning board balance with difficulty and fear of falling on compliant surface per pt. . Pt able to increase speed with gait with vc's, (Fair -) carryover  Treatment Diagnosis: Unsteadiness on feet     Goals:       Long term goals  Time Frame for Long term goals : 4-5 weeks  Long term goal 1: Improve albin LE strength to >/= 4+/5 to improve stability with standing and walking. Long term goal 2: Pt will ambulate without an AD >/= 200' with improved speed and quality S/I. Long term goal 3: Teague >/= 45/56 and DGI >/= 18/24 to reduce risk for falls. Long term goal 4: 5xSTS </= 12sec without UEs from chair to improve safety and increase ease with transfers. Progress toward goals:gait, strength, balance    POST-PAIN       Pain Rating (0-10 pain scale):  0 /10   Location and pain description same as pre-treatment unless indicated. Action: [] NA   [x] Perform HEP  [] Meds as prescribed  [x] Modalities as prescribed   [] Call Physician     Frequency/Duration:  Plan  Times per week: 2  Plan weeks: 4-5  Current Treatment Recommendations: Strengthening, Balance Training, Functional Mobility Training, Transfer Training, Gait Training, Stair training, Neuromuscular Re-education, Manual Therapy - Soft Tissue Mobilization, Home Exercise Program, Patient/Caregiver Education & Training, Equipment Evaluation, Education, & procurement, Modalities     Pt to continue current HEP. See objective section for any therapeutic exercise changes, additions or modifications this date.          PT Individual Minutes  Time In: 1500  Time Out: 7895  Minutes: 55     Signature:

## 2018-09-17 ENCOUNTER — HOSPITAL ENCOUNTER (OUTPATIENT)
Dept: PHYSICAL THERAPY | Age: 83
Setting detail: THERAPIES SERIES
Discharge: HOME | End: 2018-09-17
Payer: MEDICARE

## 2018-09-17 PROCEDURE — 97110 THERAPEUTIC EXERCISES: CPT

## 2018-09-17 PROCEDURE — 97535 SELF CARE MNGMENT TRAINING: CPT

## 2018-09-17 PROCEDURE — G8978 MOBILITY CURRENT STATUS: HCPCS

## 2018-09-17 PROCEDURE — 97112 NEUROMUSCULAR REEDUCATION: CPT

## 2018-09-17 PROCEDURE — G8979 MOBILITY GOAL STATUS: HCPCS

## 2018-09-17 NOTE — PROGRESS NOTES
improvement in DGI score, up 4 pts from eval, continued risk for falls. Pt also demonstrate improved LE strength. Pt demo'd difficulty stepping over 6\" block, catching heel  and requiring CGA to prevent fall. Spouse states pt almost missed his chair the other day, attempting to sit on arm instaed of seat. Focused partial tx breaking down approach and safety with transfers. Pt demonstrated improved ability from couch or arm chair vs. chair pushed into a table. Pt had difficulty figuring out how to pull chair away from table and sit safely despite visual and vc's. Treatment Diagnosis: Unsteadiness on feet     Goals:       Long term goals  Time Frame for Long term goals : 4-5 weeks  Long term goal 1: Improve albin LE strength to >/= 4+/5 to improve stability with standing and walking. Long term goal 2: Pt will ambulate without an AD >/= 200' with improved speed and quality S/I. Long term goal 3: Teague >/= 45/56 and DGI >/= 18/24 to reduce risk for falls. Long term goal 4: 5xSTS </= 12sec without UEs from chair to improve safety and increase ease with transfers. Progress toward goals:gait, strength, balance    POST-PAIN       Pain Rating (0-10 pain scale):  Couldn't rate /10   Location and pain description same as pre-treatment unless indicated. Action: [] NA   [x] Perform HEP  [] Meds as prescribed  [x] Modalities as prescribed   [] Call Physician     Frequency/Duration:  Plan  Times per week: 2  Plan weeks: 4-5  Current Treatment Recommendations: Strengthening, Balance Training, Functional Mobility Training, Transfer Training, Gait Training, Stair training, Neuromuscular Re-education, Manual Therapy - Soft Tissue Mobilization, Home Exercise Program, Patient/Caregiver Education & Training, Equipment Evaluation, Education, & procurement, Modalities     Pt to continue current HEP. See objective section for any therapeutic exercise changes, additions or modifications this date.          PT Individual Minutes  Time In:

## 2018-09-21 ENCOUNTER — HOSPITAL ENCOUNTER (OUTPATIENT)
Dept: PHYSICAL THERAPY | Age: 83
Setting detail: THERAPIES SERIES
Discharge: HOME | End: 2018-09-21
Payer: MEDICARE

## 2018-09-21 PROCEDURE — 97116 GAIT TRAINING THERAPY: CPT

## 2018-09-21 PROCEDURE — G8980 MOBILITY D/C STATUS: HCPCS

## 2018-09-21 PROCEDURE — 97112 NEUROMUSCULAR REEDUCATION: CPT

## 2018-09-21 PROCEDURE — G8979 MOBILITY GOAL STATUS: HCPCS

## 2018-09-21 NOTE — PROGRESS NOTES
[facility] [address]     Ph: [phone]  Fax: [phone]    [] Certification  [] Recertification []  Plan of Care  [] Progress Note [x] Discharge      To:  Referring Practitioner: Dr. Artemio Cervantes      From:  Wilbur Rubinstein, PT  Patient: Cuco Del Valle     : 1935  Diagnosis: Ataxia     Date: 2018  Treatment Diagnosis: Unsteadiness on feet    Plan of Care/Certification Expiration Date: 10/17/18  Progress Report Period from:  2018  to 2018    Total # of Visits to Date: 11   No Show: 0          OBJECTIVE:   Long Term Goals - Time Frame for Long term goals : 4-5 weeks  Goals Current/ Discharge status Met   Long term goal 1: Improve albin LE strength to >/= 4+/5 to improve stability with standing and walking. Strength RLE  Comment: Hip flex 5/5, knee ext/flex 5/5, DF 4+/5  Strength LLE  Comment: Hip flex 5/5, DF 4+/5 [x] yes  [] no   Long term goal 2: Pt will ambulate without an AD >/= 200' with improved speed and quality S/I. Ambulation 1  Surface: level tile  Device: No Device  Assistance: Supervision, Independent  Quality of Gait: Vc's to increase speed, mild FWD flexion, improved heel strike but decrased albin step length  Distance: 2 laps on track [] yes  [x] no   Long term goal 3: Teague >/= 45/56 and DGI >/= 18/24 to reduce risk for falls. Teague Balance Score: 48  DGI 14/24 [x] yes  [x] no   Long term goal 4: 5xSTS </= 12sec without UEs from chair to improve safety and increase ease with transfers. 5xSTS from mat without UEs = 24.09sec [] yes  [x] no     Body structures, Functions, Activity limitations: Decreased functional mobility , Decreased strength, Decreased balance, Decreased coordination, Decreased vision/visual deficit  Assessment: Pt with no change in Teague from a week ago but continues to demo a score placing pt at a lower risk for falls.   Pt challenged with dual tasking activities due to

## 2018-10-09 ENCOUNTER — HOSPITAL ENCOUNTER (OUTPATIENT)
Dept: PHARMACY | Age: 83
Setting detail: THERAPIES SERIES
Discharge: HOME | End: 2018-10-09
Payer: MEDICARE

## 2018-10-09 DIAGNOSIS — I48.91 ATRIAL FIBRILLATION, UNSPECIFIED TYPE (HCC): ICD-10-CM

## 2018-10-09 LAB
INR BLD: 2.7
PROTIME: 32.7 SECONDS

## 2018-10-09 PROCEDURE — 85610 PROTHROMBIN TIME: CPT | Performed by: PHARMACIST

## 2018-10-09 PROCEDURE — 99211 OFF/OP EST MAY X REQ PHY/QHP: CPT | Performed by: PHARMACIST

## 2018-10-12 ENCOUNTER — HOSPITAL ENCOUNTER (OUTPATIENT)
Dept: CARDIOLOGY | Age: 83
Discharge: HOME | End: 2018-10-12
Payer: MEDICARE

## 2018-10-12 PROCEDURE — 93296 REM INTERROG EVL PM/IDS: CPT

## 2018-11-20 ENCOUNTER — HOSPITAL ENCOUNTER (OUTPATIENT)
Dept: PHARMACY | Age: 83
Setting detail: THERAPIES SERIES
Discharge: HOME | End: 2018-11-20
Payer: MEDICARE

## 2018-11-20 DIAGNOSIS — I48.91 ATRIAL FIBRILLATION, UNSPECIFIED TYPE (HCC): ICD-10-CM

## 2018-11-20 PROCEDURE — 85610 PROTHROMBIN TIME: CPT

## 2018-11-20 PROCEDURE — 99211 OFF/OP EST MAY X REQ PHY/QHP: CPT

## 2019-01-02 ENCOUNTER — HOSPITAL ENCOUNTER (OUTPATIENT)
Dept: PHARMACY | Age: 84
Setting detail: THERAPIES SERIES
Discharge: HOME | End: 2019-01-02
Payer: MEDICARE

## 2019-01-02 DIAGNOSIS — I48.91 ATRIAL FIBRILLATION, UNSPECIFIED TYPE (HCC): ICD-10-CM

## 2019-01-02 LAB — INTERNATIONAL NORMALIZATION RATIO, POC: 3.2

## 2019-01-02 PROCEDURE — 85610 PROTHROMBIN TIME: CPT

## 2019-01-02 PROCEDURE — 99211 OFF/OP EST MAY X REQ PHY/QHP: CPT

## 2019-01-11 ENCOUNTER — HOSPITAL ENCOUNTER (OUTPATIENT)
Dept: CARDIOLOGY | Age: 84
Discharge: HOME | End: 2019-01-11
Payer: MEDICARE

## 2019-01-11 PROCEDURE — 93296 REM INTERROG EVL PM/IDS: CPT

## 2019-02-13 ENCOUNTER — HOSPITAL ENCOUNTER (OUTPATIENT)
Dept: PHARMACY | Age: 84
Setting detail: THERAPIES SERIES
Discharge: HOME | End: 2019-02-13
Payer: MEDICARE

## 2019-02-13 DIAGNOSIS — I48.91 ATRIAL FIBRILLATION, UNSPECIFIED TYPE (HCC): ICD-10-CM

## 2019-02-13 LAB — INTERNATIONAL NORMALIZATION RATIO, POC: 2

## 2019-02-13 PROCEDURE — 85610 PROTHROMBIN TIME: CPT

## 2019-02-13 PROCEDURE — 99211 OFF/OP EST MAY X REQ PHY/QHP: CPT

## 2019-03-27 ENCOUNTER — HOSPITAL ENCOUNTER (OUTPATIENT)
Dept: PHARMACY | Age: 84
Setting detail: THERAPIES SERIES
Discharge: HOME | End: 2019-03-27
Payer: MEDICARE

## 2019-03-27 DIAGNOSIS — I48.91 ATRIAL FIBRILLATION, UNSPECIFIED TYPE (HCC): ICD-10-CM

## 2019-03-27 PROCEDURE — 99211 OFF/OP EST MAY X REQ PHY/QHP: CPT

## 2019-03-27 PROCEDURE — 85610 PROTHROMBIN TIME: CPT

## 2019-03-27 RX ORDER — WARFARIN SODIUM 1 MG/1
TABLET ORAL
Qty: 100 TABLET | Refills: 1 | Status: ON HOLD | OUTPATIENT
Start: 2019-03-27 | End: 2019-06-22 | Stop reason: HOSPADM

## 2019-04-12 ENCOUNTER — HOSPITAL ENCOUNTER (OUTPATIENT)
Dept: CARDIOLOGY | Age: 84
Discharge: HOME | End: 2019-04-12
Payer: MEDICARE

## 2019-04-12 PROCEDURE — 93296 REM INTERROG EVL PM/IDS: CPT

## 2019-04-29 ENCOUNTER — APPOINTMENT (OUTPATIENT)
Dept: GENERAL RADIOLOGY | Age: 84
End: 2019-04-29
Payer: MEDICARE

## 2019-04-29 ENCOUNTER — HOSPITAL ENCOUNTER (EMERGENCY)
Age: 84
Discharge: HOME | End: 2019-04-29
Attending: STUDENT IN AN ORGANIZED HEALTH CARE EDUCATION/TRAINING PROGRAM
Payer: MEDICARE

## 2019-04-29 ENCOUNTER — APPOINTMENT (OUTPATIENT)
Dept: CT IMAGING | Age: 84
End: 2019-04-29
Payer: MEDICARE

## 2019-04-29 VITALS
HEART RATE: 84 BPM | HEIGHT: 72 IN | OXYGEN SATURATION: 99 % | BODY MASS INDEX: 25.73 KG/M2 | WEIGHT: 190 LBS | DIASTOLIC BLOOD PRESSURE: 85 MMHG | RESPIRATION RATE: 18 BRPM | SYSTOLIC BLOOD PRESSURE: 137 MMHG | TEMPERATURE: 97.6 F

## 2019-04-29 DIAGNOSIS — I95.9 TRANSIENT HYPOTENSION: ICD-10-CM

## 2019-04-29 DIAGNOSIS — R51.9 NONINTRACTABLE HEADACHE, UNSPECIFIED CHRONICITY PATTERN, UNSPECIFIED HEADACHE TYPE: Primary | ICD-10-CM

## 2019-04-29 LAB
ABO/RH: NORMAL
ALBUMIN SERPL-MCNC: 4 G/DL (ref 3.5–4.6)
ALP BLD-CCNC: 51 U/L (ref 35–104)
ALT SERPL-CCNC: 16 U/L (ref 0–41)
AMMONIA: 15 UMOL/L (ref 16–60)
ANION GAP SERPL CALCULATED.3IONS-SCNC: 14 MEQ/L (ref 9–15)
ANTIBODY SCREEN: NORMAL
APTT: 31.6 SEC (ref 21.6–35.4)
AST SERPL-CCNC: 21 U/L (ref 0–40)
BASOPHILS ABSOLUTE: 0 K/UL (ref 0–0.2)
BASOPHILS RELATIVE PERCENT: 0.5 %
BILIRUB SERPL-MCNC: 0.5 MG/DL (ref 0.2–0.7)
BUN BLDV-MCNC: 21 MG/DL (ref 8–23)
C-REACTIVE PROTEIN, HIGH SENSITIVITY: 4.8 MG/L (ref 0–5)
CALCIUM SERPL-MCNC: 9.4 MG/DL (ref 8.5–9.9)
CHLORIDE BLD-SCNC: 99 MEQ/L (ref 95–107)
CO2: 26 MEQ/L (ref 20–31)
CREAT SERPL-MCNC: 1.16 MG/DL (ref 0.7–1.2)
EKG ATRIAL RATE: 77 BPM
EKG Q-T INTERVAL: 482 MS
EKG QRS DURATION: 148 MS
EKG QTC CALCULATION (BAZETT): 531 MS
EKG R AXIS: -83 DEGREES
EKG T AXIS: 59 DEGREES
EKG VENTRICULAR RATE: 73 BPM
EOSINOPHILS ABSOLUTE: 0.3 K/UL (ref 0–0.7)
EOSINOPHILS RELATIVE PERCENT: 3.6 %
GFR AFRICAN AMERICAN: >60
GFR NON-AFRICAN AMERICAN: >60
GLOBULIN: 2.9 G/DL (ref 2.3–3.5)
GLUCOSE BLD-MCNC: 130 MG/DL (ref 70–99)
GLUCOSE BLD-MCNC: 98 MG/DL (ref 60–115)
HCT VFR BLD CALC: 39.8 % (ref 42–52)
HEMOGLOBIN: 13.7 G/DL (ref 14–18)
INR BLD: 1.9
LYMPHOCYTES ABSOLUTE: 2.3 K/UL (ref 1–4.8)
LYMPHOCYTES RELATIVE PERCENT: 28.4 %
MAGNESIUM: 2.1 MG/DL (ref 1.7–2.4)
MCH RBC QN AUTO: 32.4 PG (ref 27–31.3)
MCHC RBC AUTO-ENTMCNC: 34.5 % (ref 33–37)
MCV RBC AUTO: 94 FL (ref 80–100)
MONOCYTES ABSOLUTE: 0.5 K/UL (ref 0.2–0.8)
MONOCYTES RELATIVE PERCENT: 6.4 %
NEUTROPHILS ABSOLUTE: 5 K/UL (ref 1.4–6.5)
NEUTROPHILS RELATIVE PERCENT: 61.1 %
PDW BLD-RTO: 13.7 % (ref 11.5–14.5)
PERFORMED ON: NORMAL
PLATELET # BLD: 254 K/UL (ref 130–400)
PLATELET SLIDE REVIEW: NORMAL
POTASSIUM SERPL-SCNC: 4.3 MEQ/L (ref 3.4–4.9)
PRO-BNP: 7825 PG/ML
PROTHROMBIN TIME: 18.9 SEC (ref 9–11.5)
RBC # BLD: 4.24 M/UL (ref 4.7–6.1)
SEDIMENTATION RATE, ERYTHROCYTE: 28 MM (ref 0–20)
SLIDE REVIEW: ABNORMAL
SODIUM BLD-SCNC: 139 MEQ/L (ref 135–144)
TOTAL CK: 60 U/L (ref 0–190)
TOTAL PROTEIN: 6.9 G/DL (ref 6.3–8)
TROPONIN: <0.01 NG/ML (ref 0–0.01)
WBC # BLD: 8.1 K/UL (ref 4.8–10.8)

## 2019-04-29 PROCEDURE — 36415 COLL VENOUS BLD VENIPUNCTURE: CPT

## 2019-04-29 PROCEDURE — 70450 CT HEAD/BRAIN W/O DYE: CPT

## 2019-04-29 PROCEDURE — 85610 PROTHROMBIN TIME: CPT

## 2019-04-29 PROCEDURE — 80053 COMPREHEN METABOLIC PANEL: CPT

## 2019-04-29 PROCEDURE — 86850 RBC ANTIBODY SCREEN: CPT

## 2019-04-29 PROCEDURE — 86900 BLOOD TYPING SEROLOGIC ABO: CPT

## 2019-04-29 PROCEDURE — 86141 C-REACTIVE PROTEIN HS: CPT

## 2019-04-29 PROCEDURE — 99285 EMERGENCY DEPT VISIT HI MDM: CPT

## 2019-04-29 PROCEDURE — 85025 COMPLETE CBC W/AUTO DIFF WBC: CPT

## 2019-04-29 PROCEDURE — 86901 BLOOD TYPING SEROLOGIC RH(D): CPT

## 2019-04-29 PROCEDURE — 93005 ELECTROCARDIOGRAM TRACING: CPT

## 2019-04-29 PROCEDURE — 71045 X-RAY EXAM CHEST 1 VIEW: CPT

## 2019-04-29 PROCEDURE — 82140 ASSAY OF AMMONIA: CPT

## 2019-04-29 PROCEDURE — 83880 ASSAY OF NATRIURETIC PEPTIDE: CPT

## 2019-04-29 PROCEDURE — 85730 THROMBOPLASTIN TIME PARTIAL: CPT

## 2019-04-29 PROCEDURE — 83735 ASSAY OF MAGNESIUM: CPT

## 2019-04-29 PROCEDURE — 85652 RBC SED RATE AUTOMATED: CPT

## 2019-04-29 PROCEDURE — 82550 ASSAY OF CK (CPK): CPT

## 2019-04-29 PROCEDURE — 84484 ASSAY OF TROPONIN QUANT: CPT

## 2019-04-29 ASSESSMENT — ENCOUNTER SYMPTOMS
VOMITING: 0
SHORTNESS OF BREATH: 0
EYE REDNESS: 0
COUGH: 0
PHOTOPHOBIA: 0
TROUBLE SWALLOWING: 0
DIARRHEA: 0
EYE PAIN: 0
BACK PAIN: 0
SINUS PRESSURE: 0
ABDOMINAL PAIN: 0
CHEST TIGHTNESS: 0

## 2019-04-29 NOTE — ED TRIAGE NOTES
Pt arrived via ems from home with c/o ha in back of head starting approx 1230 but no longer has one. Per wife pt was pale and diaphoretic. Pt denies ha now and is joking with rn.per ems b/p 95/61 bs 188. Pt has hx of dementia.

## 2019-04-29 NOTE — ED NOTES
Bed: 12  Expected date:   Expected time:   Means of arrival:   Comments:     Molly Herrmann RN  04/29/19 [id number]

## 2019-04-29 NOTE — ED PROVIDER NOTES
[facility] ED  eMERGENCY dEPARTMENT eNCOUnter      Pt Name: Sahara Duke  MRN: [id number]  Krathikgfteresa 1935  Date of evaluation: 4/29/2019  Provider: Tanner Mccormack, [address] [place] [address]       Chief Complaint   Patient presents with    Headache     none now         HISTORY OF PRESENT ILLNESS   (Location/Symptom, Timing/Onset,Context/Setting, Quality, Duration, Modifying Factors, Severity)  Note limiting factors. Sahara Duke is a 81 y.o. male who presents to the emergency department with c/o frontal headache, low BP and sweating. Systolic BP was 92. EMS gave patient IV fluids. Patient was confused and AOx1 (hx of alzheimer's dementia). Patient AOx4 upon arrival to the ER. Patient is back to normal now. Patient denies fever, chill, nausea or vomiting. Patient states in the ER that he feels perfectly fine. HPI    NursingNotes were reviewed. REVIEW OF SYSTEMS    (2-9 systems for level 4, 10 or more for level 5)     Review of Systems   Constitutional: Positive for diaphoresis. Negative for activity change, appetite change, chills, fever and unexpected weight change. HENT: Negative for drooling, ear pain, nosebleeds, sinus pressure and trouble swallowing. Eyes: Negative for photophobia, pain, redness and visual disturbance. Respiratory: Negative for cough, chest tightness and shortness of breath. Cardiovascular: Negative for chest pain and leg swelling. Gastrointestinal: Negative for abdominal pain, diarrhea and vomiting. Endocrine: Negative for polydipsia and polyphagia. Genitourinary: Negative for dysuria, flank pain and frequency. Musculoskeletal: Negative for back pain and myalgias. Skin: Negative for pallor and rash. Neurological: Positive for headaches. Negative for syncope and weakness. Hematological: Does not bruise/bleed easily. Psychiatric/Behavioral: Positive for confusion. All other systems reviewed and are negative.       Except as noted above the remainder of the review of systems was reviewed and negative. PAST MEDICAL HISTORY     Past Medical History:   Diagnosis Date    Annual physical exam 10/27/2011    [facility]rial fibrillation ([facility]     Benign prostatic hypertrophy without urinary obstruction     Gout     History of colonoscopy 11/7/12 & 6/9/2005    Nathanael Ordonez    Hypertension     Hyperthyroidism     Osteoarthritis     Pacemaker     Sinoatrial node dysfunction ([facility] [address]          SURGICALHISTORY       Past Surgical History:   Procedure Laterality Date    ABDOMEN SURGERY      CARDIAC SURGERY Left 07/14/2016    L ARM VENOGRAM-PIN PLACEMENT IN ATRIAL PORT    COLONOSCOPY  2012    Dr Huffman [facility]    CYSTOURETHROSCOPY  11/05/14    HERNIA REPAIR Left 7/16/13    Repair of recurrent LIH    PACEMAKER PLACEMENT  4/16 6/16    TOTAL KNEE ARTHROPLASTY Right 2014    UPPER GASTROINTESTINAL ENDOSCOPY  2012    Dr Jose A Blancas       Previous Medications    ALLOPURINOL (ZYLOPRIM) 300 MG TABLET    TAKE ONE-HALF (1/2) TABLET DAILY    CALCIUM CARBONATE-VITAMIN D (CALCIUM 500 + D PO)    Take 1 tablet by mouth daily. CHOLECALCIFEROL (VITAMIN D3) 2000 UNITS CAPS    Take 1,000 mg by mouth daily. FINASTERIDE (PROSCAR) 5 MG TABLET    Take 1 tablet by mouth daily    FISH OIL OIL    by Does not apply route 360 mg one capsule daily    GLUCOSAMINE-CHONDROITIN 500-400 MG CAPS    Take 1 capsule by mouth daily. MAGNESIUM OXIDE (MAG-OX) 400 MG TABLET    Take 400 mg by mouth daily    METOPROLOL (LOPRESSOR) 25 MG TABLET    Take 12.5 mg by mouth 2 times daily     MULTIPLE VITAMIN (TAB-A-YVETTE PO)    Take 1 tablet by mouth daily. PAROXETINE (PAXIL) 10 MG TABLET    Take 10 mg by mouth every morning    QUETIAPINE (SEROQUEL) 25 MG TABLET    Take 50 mg by mouth 2 times daily     RISPERIDONE (RISPERDAL) 0.5 MG TABLET    Take 0.5 mg by mouth every evening     SIMVASTATIN (ZOCOR) 20 MG TABLET    Take 1 tablet by mouth nightly.     WARFARIN (COUMADIN) 1 MG TABLET    Take as directed by [facility] [place] [facility]. Quantity equals 90 day supply. ALLERGIES     Adhesive tape and Rivastigmine    FAMILY HISTORY       Family History   Problem Relation Age of Onset    Cancer Mother         ovarian    Sudden Death Father     Cancer Sister     Cancer Brother           SOCIAL HISTORY       Social History     Socioeconomic History    Marital status:      Spouse name: None    Number of children: None    Years of education: None    Highest education level: None   Occupational History    None   Social Needs    Financial resource strain: None    Food insecurity:     Worry: None     Inability: None    Transportation needs:     Medical: None     Non-medical: None   Tobacco Use    Smoking status: Never Smoker    Smokeless tobacco: Never Used   Substance and Sexual Activity    Alcohol use: No    Drug use: No    Sexual activity: None   Lifestyle    Physical activity:     Days per week: None     Minutes per session: None    Stress: None   Relationships    Social connections:     Talks on phone: None     Gets together: None     Attends Yazdanism service: None     Active member of club or organization: None     Attends meetings of clubs or organizations: None     Relationship status: None    Intimate partner violence:     Fear of current or ex partner: None     Emotionally abused: None     Physically abused: None     Forced sexual activity: None   Other Topics Concern    None   Social History Narrative    ADLs and IADLs intact               SCREENINGS   NIH Stroke Scale  Interval: Baseline  Level of Consciousness (1a. ): Alert  LOC Questions (1b. ):  Answers both correctly  LOC Commands (1c. ): Obeys both correctly  Best Gaze (2. ): Normal  Visual (3. ): No visual loss  Facial Palsy (4. ): Normal  Motor Arm, Left (5a. ): No drift  Motor Arm, Right (5b. ): No drift  Motor Leg, Left (6a. ): No drift  Motor Leg, Right (6b. ): No Head (right side): No submental adenopathy present. Head (left side): No submental adenopathy present. Neurological: He is alert and oriented to person, place, and time. He has normal reflexes. No cranial nerve deficit or sensory deficit. He exhibits normal muscle tone. Coordination normal.   No pronator drift. No leg drift. Patient moves all 4 extremities purposefully and symmetrically. Skin: Skin is warm and dry. No rash noted. He is not diaphoretic. No erythema. Psychiatric: He has a normal mood and affect. His behavior is normal. Judgment and thought content normal.   Nursing note and vitals reviewed. DIAGNOSTIC RESULTS     EKG: All EKG's are interpreted by the Emergency Department Physician who either signs or Co-signsthis chart in the absence of a cardiologist.    EKG: Electronic ventricular paced rhythm at 73 bpm, left axis, no PVCs. RADIOLOGY:   Non-plain filmimages such as CT, Ultrasound and MRI are read by the radiologist. Plain radiographic images are visualized and preliminarily interpreted by the emergency physician with the below findings:        Interpretation per the Radiologist below, if available at the time ofthis note:    CT Head WO Contrast   Final Result      SENESCENT CHANGES OF AGING INCLUDING CHRONIC SMALL VESSEL VASCULOPATHY AND ATROPHY. NO ACUTE PROCESS IN THE BRAIN. NO SIGNIFICANT INTERVAL CHANGE FROM 5/25/2018. All CT scans at this facility use dose modulation, iterative reconstruction, and/or weight based dosing when appropriate to reduce radiation dose to as low as reasonably achievable. XR CHEST PORTABLE   Final Result        Chest x-ray: No infiltrate, no pleural effusion, no pneumothorax, no free air.     ED BEDSIDE ULTRASOUND:   Performed by ED Physician - none    LABS:  Labs Reviewed   AMMONIA - Abnormal; Notable for the following components:       Result Value    Ammonia 15 (*)     All other components within normal limits   CBC WITH AUTO DISPOSITION/PLAN   DISPOSITION Decision To Discharge 04/29/2019 04:41:30 PM      PATIENT REFERRED TO:  Jose Villavicencio MD  [address] [place] [address]  [phone]    Schedule an appointment as soon as possible for a visit in 1 day      Emma Peoples MD  [address] [place] [address]  [phone]    Call in 1 day        DISCHARGE MEDICATIONS:  New Prescriptions    No medications on file          (Please note that portions of this note were completed with a voice recognition program.  Efforts were made to edit the dictations but occasionally words are mis-transcribed.)    Tanner Mccormack DO (electronically signed)  Attending Emergency Physician          Tanner Mccormack DO  04/29/19 [id number]

## 2019-04-30 PROCEDURE — 93010 ELECTROCARDIOGRAM REPORT: CPT | Performed by: INTERNAL MEDICINE

## 2019-05-03 ENCOUNTER — HOSPITAL ENCOUNTER (OUTPATIENT)
Dept: CARDIOLOGY | Age: 84
Discharge: HOME | End: 2019-05-03
Payer: MEDICARE

## 2019-05-03 PROCEDURE — 93296 REM INTERROG EVL PM/IDS: CPT

## 2019-05-08 ENCOUNTER — HOSPITAL ENCOUNTER (OUTPATIENT)
Dept: PHARMACY | Age: 84
Setting detail: THERAPIES SERIES
Discharge: HOME | End: 2019-05-08
Payer: MEDICARE

## 2019-05-08 DIAGNOSIS — I48.91 ATRIAL FIBRILLATION, UNSPECIFIED TYPE (HCC): ICD-10-CM

## 2019-05-08 LAB — INTERNATIONAL NORMALIZATION RATIO, POC: 2.1

## 2019-05-08 PROCEDURE — 85610 PROTHROMBIN TIME: CPT | Performed by: PHARMACIST

## 2019-05-08 PROCEDURE — 99211 OFF/OP EST MAY X REQ PHY/QHP: CPT | Performed by: PHARMACIST

## 2019-05-08 NOTE — PROGRESS NOTES
Mr. Aubrie Alonso is a 81 y.o. y/o male with history of Afib who presents today for anticoagulation monitoring and adjustment.   INR 2.1 is therapeutic for this patient (goal range 2-3) and is reflective of 7 mg TWD  Patient verifies current dosing regimen, patient able to verbally recall dose  Patient reports 0  missed doses since last INR   Patient denies s/sx clotting and/or stroke  Patient denies hematuria, epistaxis, rectal bleeding  Patient denies changes in diet, alcohol, or tobacco use  Reviewed medication list and drug allergies with patient, updated any medication additions or modifications accordingly  Patient also denies any pending medical or dental procedures scheduled at this time  Patient was instructed to continue 7 mg TWD and RTC 6 weeks

## 2019-05-09 ENCOUNTER — HOSPITAL ENCOUNTER (OUTPATIENT)
Dept: NEUROLOGY | Age: 84
Discharge: HOME | End: 2019-05-09
Payer: MEDICARE

## 2019-05-09 ENCOUNTER — HOSPITAL ENCOUNTER (OUTPATIENT)
Dept: GENERAL RADIOLOGY | Age: 84
End: 2019-05-09
Payer: MEDICARE

## 2019-05-09 PROCEDURE — 95816 EEG AWAKE AND DROWSY: CPT

## 2019-06-19 ENCOUNTER — HOSPITAL ENCOUNTER (OUTPATIENT)
Dept: PHARMACY | Age: 84
Setting detail: THERAPIES SERIES
Discharge: HOME | End: 2019-06-19
Payer: MEDICARE

## 2019-06-19 DIAGNOSIS — I48.91 ATRIAL FIBRILLATION, UNSPECIFIED TYPE (HCC): ICD-10-CM

## 2019-06-19 LAB — INTERNATIONAL NORMALIZATION RATIO, POC: 2.4

## 2019-06-19 PROCEDURE — 99211 OFF/OP EST MAY X REQ PHY/QHP: CPT

## 2019-06-19 PROCEDURE — 85610 PROTHROMBIN TIME: CPT

## 2019-06-19 NOTE — PROGRESS NOTES
Mr. Usama Krishnan is a 81 y.o. y/o male with history of Afib who presents today for anticoagulation monitoring and adjustment.   INR 2.4 is therapeutic for this patient (goal range 2-3) and is reflective of 7 mg TWD  Patient verifies current dosing regimen, patient able to verbally recall dose  Patient reports no  missed doses since last INR   Patient denies s/sx clotting and/or stroke  Patient denies hematuria, epistaxis, rectal bleeding  Patient denies changes in diet, alcohol, or tobacco use  Reviewed medication list and drug allergies with patient, updated any medication additions or modifications accordingly  Patient also denies any pending medical or dental procedures scheduled at this time  Patient was instructed to continue current regimen of 7 mg TWD and RTC 6 weeks    Susanna Carroll, PharmD  Staff Pharmacist  6/19/2019 2:13 PM

## 2019-06-21 ENCOUNTER — APPOINTMENT (OUTPATIENT)
Dept: GENERAL RADIOLOGY | Age: 84
End: 2019-06-21
Payer: MEDICARE

## 2019-06-21 ENCOUNTER — APPOINTMENT (OUTPATIENT)
Dept: CT IMAGING | Age: 84
End: 2019-06-21
Payer: MEDICARE

## 2019-06-21 ENCOUNTER — HOSPITAL ENCOUNTER (OUTPATIENT)
Age: 84
Setting detail: OBSERVATION
Discharge: HOME | End: 2019-06-22
Attending: EMERGENCY MEDICINE | Admitting: INTERNAL MEDICINE
Payer: MEDICARE

## 2019-06-21 DIAGNOSIS — I95.9 TRANSIENT HYPOTENSION: ICD-10-CM

## 2019-06-21 DIAGNOSIS — R55 SYNCOPE AND COLLAPSE: Primary | ICD-10-CM

## 2019-06-21 LAB
ALBUMIN SERPL-MCNC: 4 G/DL (ref 3.5–4.6)
ALP BLD-CCNC: 53 U/L (ref 35–104)
ALT SERPL-CCNC: 14 U/L (ref 0–41)
ANION GAP SERPL CALCULATED.3IONS-SCNC: 12 MEQ/L (ref 9–15)
APTT: 36 SEC (ref 21.6–35.4)
AST SERPL-CCNC: 20 U/L (ref 0–40)
BASOPHILS ABSOLUTE: 0 K/UL (ref 0–0.2)
BASOPHILS RELATIVE PERCENT: 0.4 %
BILIRUB SERPL-MCNC: 0.5 MG/DL (ref 0.2–0.7)
BUN BLDV-MCNC: 17 MG/DL (ref 8–23)
CALCIUM SERPL-MCNC: 9.1 MG/DL (ref 8.5–9.9)
CHLORIDE BLD-SCNC: 102 MEQ/L (ref 95–107)
CHP ED QC CHECK: NORMAL
CO2: 26 MEQ/L (ref 20–31)
CREAT SERPL-MCNC: 1.16 MG/DL (ref 0.7–1.2)
EKG ATRIAL RATE: 70 BPM
EKG Q-T INTERVAL: 468 MS
EKG QRS DURATION: 150 MS
EKG QTC CALCULATION (BAZETT): 536 MS
EKG R AXIS: -85 DEGREES
EKG T AXIS: 66 DEGREES
EKG VENTRICULAR RATE: 79 BPM
EOSINOPHILS ABSOLUTE: 0.4 K/UL (ref 0–0.7)
EOSINOPHILS RELATIVE PERCENT: 3.8 %
GFR AFRICAN AMERICAN: >60
GFR NON-AFRICAN AMERICAN: >60
GLOBULIN: 3.1 G/DL (ref 2.3–3.5)
GLUCOSE BLD-MCNC: 123 MG/DL (ref 70–99)
GLUCOSE BLD-MCNC: 96 MG/DL
GLUCOSE BLD-MCNC: 96 MG/DL (ref 60–115)
HCT VFR BLD CALC: 39 % (ref 42–52)
HEMOGLOBIN: 13.7 G/DL (ref 14–18)
INR BLD: 2.5
LACTIC ACID: 1.6 MMOL/L (ref 0.5–2.2)
LYMPHOCYTES ABSOLUTE: 3 K/UL (ref 1–4.8)
LYMPHOCYTES RELATIVE PERCENT: 28.4 %
MCH RBC QN AUTO: 33.3 PG (ref 27–31.3)
MCHC RBC AUTO-ENTMCNC: 35 % (ref 33–37)
MCV RBC AUTO: 95 FL (ref 80–100)
MONOCYTES ABSOLUTE: 0.7 K/UL (ref 0.2–0.8)
MONOCYTES RELATIVE PERCENT: 6.9 %
NEUTROPHILS ABSOLUTE: 6.4 K/UL (ref 1.4–6.5)
NEUTROPHILS RELATIVE PERCENT: 60.5 %
PDW BLD-RTO: 13.7 % (ref 11.5–14.5)
PERFORMED ON: NORMAL
PLATELET # BLD: 271 K/UL (ref 130–400)
POTASSIUM SERPL-SCNC: 4.3 MEQ/L (ref 3.4–4.9)
PROTHROMBIN TIME: 24.2 SEC (ref 9–11.5)
RBC # BLD: 4.11 M/UL (ref 4.7–6.1)
SODIUM BLD-SCNC: 140 MEQ/L (ref 135–144)
TOTAL CK: 57 U/L (ref 0–190)
TOTAL PROTEIN: 7.1 G/DL (ref 6.3–8)
TROPONIN: <0.01 NG/ML (ref 0–0.01)
WBC # BLD: 10.6 K/UL (ref 4.8–10.8)

## 2019-06-21 PROCEDURE — 93010 ELECTROCARDIOGRAM REPORT: CPT | Performed by: INTERNAL MEDICINE

## 2019-06-21 PROCEDURE — 80053 COMPREHEN METABOLIC PANEL: CPT

## 2019-06-21 PROCEDURE — 87040 BLOOD CULTURE FOR BACTERIA: CPT

## 2019-06-21 PROCEDURE — 85730 THROMBOPLASTIN TIME PARTIAL: CPT

## 2019-06-21 PROCEDURE — 2580000003 HC RX 258: Performed by: PHYSICIAN ASSISTANT

## 2019-06-21 PROCEDURE — 85025 COMPLETE CBC W/AUTO DIFF WBC: CPT

## 2019-06-21 PROCEDURE — 71045 X-RAY EXAM CHEST 1 VIEW: CPT

## 2019-06-21 PROCEDURE — 72050 X-RAY EXAM NECK SPINE 4/5VWS: CPT

## 2019-06-21 PROCEDURE — 84484 ASSAY OF TROPONIN QUANT: CPT

## 2019-06-21 PROCEDURE — 93005 ELECTROCARDIOGRAM TRACING: CPT | Performed by: PHYSICIAN ASSISTANT

## 2019-06-21 PROCEDURE — 82550 ASSAY OF CK (CPK): CPT

## 2019-06-21 PROCEDURE — 36415 COLL VENOUS BLD VENIPUNCTURE: CPT

## 2019-06-21 PROCEDURE — 83605 ASSAY OF LACTIC ACID: CPT

## 2019-06-21 PROCEDURE — 70450 CT HEAD/BRAIN W/O DYE: CPT

## 2019-06-21 PROCEDURE — 99285 EMERGENCY DEPT VISIT HI MDM: CPT

## 2019-06-21 PROCEDURE — G0378 HOSPITAL OBSERVATION PER HR: HCPCS

## 2019-06-21 PROCEDURE — 85610 PROTHROMBIN TIME: CPT

## 2019-06-21 RX ORDER — DONEPEZIL HYDROCHLORIDE 5 MG/1
5 TABLET, FILM COATED ORAL DAILY
Status: ON HOLD | COMMUNITY
End: 2019-07-06 | Stop reason: HOSPADM

## 2019-06-21 RX ORDER — WARFARIN SODIUM 1 MG/1
1 TABLET ORAL ONCE
Status: DISCONTINUED | OUTPATIENT
Start: 2019-06-21 | End: 2019-06-22

## 2019-06-21 RX ORDER — SODIUM CHLORIDE 0.9 % (FLUSH) 0.9 %
10 SYRINGE (ML) INJECTION EVERY 12 HOURS SCHEDULED
Status: DISCONTINUED | OUTPATIENT
Start: 2019-06-21 | End: 2019-06-22 | Stop reason: HOSPADM

## 2019-06-21 RX ORDER — RISPERIDONE 0.5 MG/1
0.5 TABLET, FILM COATED ORAL 3 TIMES DAILY
Status: DISCONTINUED | OUTPATIENT
Start: 2019-06-22 | End: 2019-06-22

## 2019-06-21 RX ORDER — FINASTERIDE 5 MG/1
5 TABLET, FILM COATED ORAL DAILY
Status: DISCONTINUED | OUTPATIENT
Start: 2019-06-22 | End: 2019-06-22 | Stop reason: HOSPADM

## 2019-06-21 RX ORDER — DONEPEZIL HYDROCHLORIDE 5 MG/1
5 TABLET, FILM COATED ORAL DAILY
Status: DISCONTINUED | OUTPATIENT
Start: 2019-06-22 | End: 2019-06-22 | Stop reason: HOSPADM

## 2019-06-21 RX ORDER — SODIUM CHLORIDE 0.9 % (FLUSH) 0.9 %
10 SYRINGE (ML) INJECTION PRN
Status: DISCONTINUED | OUTPATIENT
Start: 2019-06-21 | End: 2019-06-22 | Stop reason: HOSPADM

## 2019-06-21 RX ORDER — SODIUM CHLORIDE 9 MG/ML
INJECTION, SOLUTION INTRAVENOUS CONTINUOUS
Status: DISCONTINUED | OUTPATIENT
Start: 2019-06-21 | End: 2019-06-22

## 2019-06-21 RX ORDER — ONDANSETRON 2 MG/ML
4 INJECTION INTRAMUSCULAR; INTRAVENOUS EVERY 6 HOURS PRN
Status: DISCONTINUED | OUTPATIENT
Start: 2019-06-21 | End: 2019-06-22 | Stop reason: HOSPADM

## 2019-06-21 RX ORDER — PAROXETINE 10 MG/1
10 TABLET, FILM COATED ORAL NIGHTLY
Status: DISCONTINUED | OUTPATIENT
Start: 2019-06-22 | End: 2019-06-22 | Stop reason: HOSPADM

## 2019-06-21 RX ORDER — ACETAMINOPHEN 325 MG/1
650 TABLET ORAL EVERY 4 HOURS PRN
Status: DISCONTINUED | OUTPATIENT
Start: 2019-06-21 | End: 2019-06-22 | Stop reason: HOSPADM

## 2019-06-21 RX ADMIN — SODIUM CHLORIDE: 9 INJECTION, SOLUTION INTRAVENOUS at 14:08

## 2019-06-21 ASSESSMENT — ENCOUNTER SYMPTOMS
EYE DISCHARGE: 0
CONSTIPATION: 0
EYES NEGATIVE: 1
ALLERGIC/IMMUNOLOGIC NEGATIVE: 1
GASTROINTESTINAL NEGATIVE: 1
ABDOMINAL DISTENTION: 0
SHORTNESS OF BREATH: 0
RESPIRATORY NEGATIVE: 1
RHINORRHEA: 0
ABDOMINAL PAIN: 0
SORE THROAT: 0
COLOR CHANGE: 0

## 2019-06-21 ASSESSMENT — PAIN SCALES - GENERAL: PAINLEVEL_OUTOF10: 0

## 2019-06-21 NOTE — ED PROVIDER NOTES
[address] [facility] ED  eMERGENCY dEPARTMENT eNCOUnter      Pt Name: Yas Montalvo  MRN: [id number]  [place] 1935  Date of evaluation: 6/21/2019  Provider: Julio Franz PA-C    CHIEF COMPLAINT       Chief Complaint   Patient presents with    Loss of Consciousness     Pt was sitting at kitchen table when he stated to get cool, clammy, and pale         HISTORY OF PRESENT ILLNESS   (Location/Symptom, Timing/Onset,Context/Setting, Quality, Duration, Modifying Factors, Severity)  Note limiting factors. Yas Montalvo is a 81 y.o. male who presents to the emergency department plaint of a syncopal episode while sitting at the kitchen table. States she was sitting with the patient he complained of frontal head pain, she said he seemed to get pale and diaphoretic and then had a syncopal episode that she states lasted only 2 minutes. She states he was still slow and lethargic after he started coming around EMS arrival they said he was hypotensive with systolic pressures in the 70s. Wife said he had a similar episode couple months back but with no specific diagnosis. She states that he did have a fall 2 days ago which was unwitnessed by her she is not sure whether he struck his head or not, he is currently on Coumadin. HPI    NursingNotes were reviewed. REVIEW OF SYSTEMS    (2-9 systems for level 4, 10 or more for level 5)     Review of Systems   Constitutional: Negative for activity change and appetite change. HENT: Negative for congestion, ear discharge, ear pain, nosebleeds, rhinorrhea and sore throat. Eyes: Negative for discharge. Respiratory: Negative for shortness of breath. Cardiovascular: Negative for chest pain, palpitations and leg swelling. Gastrointestinal: Negative for abdominal distention, abdominal pain and constipation. Genitourinary: Negative for difficulty urinating and dysuria. Musculoskeletal: Negative for arthralgias.    Skin: Negative for color change, pallor, by mouth every morning    PROBIOTIC PRODUCT (ALIGN PO)    Take by mouth daily    QUETIAPINE (SEROQUEL) 25 MG TABLET    Take 50 mg by mouth 2 times daily     RISPERIDONE (RISPERDAL) 0.5 MG TABLET    Take 0.5 mg by mouth 3 times daily     SIMVASTATIN (ZOCOR) 20 MG TABLET    Take 1 tablet by mouth nightly. WARFARIN (COUMADIN) 1 MG TABLET    Take as directed by [facility] EMERGENCY [facility] AT [facility]. Quantity equals 90 day supply.        ALLERGIES     Adhesive tape and Rivastigmine    FAMILY HISTORY       Family History   Problem Relation Age of Onset    Cancer Mother         ovarian    Sudden Death Father     Cancer Sister     Cancer Brother           SOCIAL HISTORY       Social History     Socioeconomic History    Marital status:      Spouse name: None    Number of children: None    Years of education: None    Highest education level: None   Occupational History    None   Social Needs    Financial resource strain: None    Food insecurity:     Worry: None     Inability: None    Transportation needs:     Medical: None     Non-medical: None   Tobacco Use    Smoking status: Never Smoker    Smokeless tobacco: Never Used   Substance and Sexual Activity    Alcohol use: No    Drug use: No    Sexual activity: None   Lifestyle    Physical activity:     Days per week: None     Minutes per session: None    Stress: None   Relationships    Social connections:     Talks on phone: None     Gets together: None     Attends Temple service: None     Active member of club or organization: None     Attends meetings of clubs or organizations: None     Relationship status: None    Intimate partner violence:     Fear of current or ex partner: None     Emotionally abused: None     Physically abused: None     Forced sexual activity: None   Other Topics Concern    None   Social History Narrative    ADLs and IADLs intact               SCREENINGS    Luis A Coma Scale  Eye Opening: Spontaneous  Best Verbal Response: Oriented  Best Motor Response: Obeys commands  Luis A Coma Scale Score: 15 @FLOW([id number])@      PHYSICAL EXAM    (up to 7 for level 4, 8 or more for level 5)     ED Triage Vitals [06/21/19 1331]   BP Temp Temp Source Pulse Resp SpO2 Height Weight   120/72 96.4 °F (35.8 °C) Tympanic 87 16 100 % -- 192 lb (87.1 kg)       Physical Exam   Constitutional: He appears well-developed and well-nourished. HENT:   Head: Normocephalic and atraumatic. No signs of cut scrapes or abrasions noted to facial or scalp region there is no hematomas no depressions no pain on palpation. Eyes: Pupils are equal, round, and reactive to light. EOM are normal.   Neck: Normal range of motion. Neck supple. No JVD present. No tracheal deviation present. Neck is supple there is no midline tenderness or is no crepitus no deformity full range of motion without increased pain. Cardiovascular: Normal rate. Pulmonary/Chest: Effort normal and breath sounds normal. No respiratory distress. He has no wheezes. He has no rales. He exhibits no tenderness. Abdominal: He exhibits no distension and no mass. There is no tenderness. There is no guarding. Musculoskeletal: Normal range of motion. He exhibits no edema or deformity. Moving all extremities well   Neurological: He is alert. Coordination normal.   Is alert and oriented his normal status according to wife he does have a past history of dementia. She is clear there is no facial droop there is no drift upper or lower extremities. Skin: Skin is warm. DIAGNOSTIC RESULTS     EKG: All EKG's are interpreted by the Emergency Department Physician who either signs or Co-signsthis chart in the absence of a cardiologist.    EKG shows ventricular paced rhythm at 79 bpm QT is 460 ms.     RADIOLOGY:   Non-plain filmimages such as CT, Ultrasound and MRI are read by the radiologist. Plain radiographic images are visualized and preliminarily interpreted by the emergency Prescriptions    No medications on file          (Please note that portions of this note were completed with a voice recognition program.  Efforts were made to edit the dictations but occasionally words are mis-transcribed.)    Kavin Ramirez PA-C (electronically signed)  Attending Emergency Physician         Kavin Ramirez PA-C  06/21/19 4569

## 2019-06-21 NOTE — FLOWSHEET NOTE
Pt arrived to room via cart assisted into bed with staff that was a very hard to get him to under stand,  Pt is confused but is a jokester at times with staff. Wife at bedside pt was given dinner and was assisted by wife,  Ate 75% of dinner. Many family members at bed side. Wife also given dinner. Electronically signed by Fatimah Whelan LPN on 6/82/7284 at 9:86 PM

## 2019-06-21 NOTE — H&P
Hospitalist History and Physical  Name: Flavia Chopra  Age: 81 y.o. Gender: male  CodeStatus: No Order  Allergies: Adhesive Tape  Rivastigmine    Chief Complaint:Loss of Consciousness (Pt was sitting at kitchen table when he stated to get cool, clammy, and pale)    Primary Care Provider: Lola Osborne MD  InpatientTreatment Team: Treatment Team: Physician Assistant: Lorna Crocker PA-C; Registered Nurse: Peg Orellana RN; Consulting Physician: Vargas Lema MD  Admission Date: 6/21/2019      Subjective: 80 y/o male presented to the emergency room via ems for possible syncopal episode. Patient unable to provide history d/t Alzheimer's. Family states patient was sitting at table and became pale, diaphoretic and complained of frontal head pain. Family cannot confirm if patient lost consciousness. On EMS arrival patient was hypotensive but currently stable. Patient did have an unwitnessed fall 2 days prior and it is unsure if patient sustained any head injury or LOC. Physical Exam   Constitutional: He appears well-developed and well-nourished. HENT:   Head: Normocephalic and atraumatic. Right Ear: External ear normal.   Left Ear: External ear normal.   Mouth/Throat: Oropharynx is clear and moist.   Eyes: Pupils are equal, round, and reactive to light. Conjunctivae and EOM are normal.   Neck: Normal range of motion. Neck supple. Cardiovascular: Normal rate, regular rhythm, normal heart sounds and intact distal pulses. Paced rhythm    Pulmonary/Chest: Effort normal and breath sounds normal.   Abdominal: Soft. Bowel sounds are normal.   Musculoskeletal: Normal range of motion. Neurological: He is alert. Alert to self, Pleasantly confused    Skin: Skin is warm and dry. Capillary refill takes less than 2 seconds. Psychiatric: He has a normal mood and affect. Review of Systems   Constitutional: Negative. HENT: Negative. Eyes: Negative. Respiratory: Negative.     Cardiovascular: Sexual activity: Not on file   Lifestyle    Physical activity:     Days per week: Not on file     Minutes per session: Not on file    Stress: Not on file   Relationships    Social connections:     Talks on phone: Not on file     Gets together: Not on file     Attends Yarsani service: Not on file     Active member of club or organization: Not on file     Attends meetings of clubs or organizations: Not on file     Relationship status: Not on file    Intimate partner violence:     Fear of current or ex partner: Not on file     Emotionally abused: Not on file     Physically abused: Not on file     Forced sexual activity: Not on file   Other Topics Concern    Not on file   Social History Narrative    ADLs and IADLs intact                Infusion Medications:    sodium chloride 125 mL/hr at 06/21/19 1408     Scheduled Medications:   PRN Meds:     Labs:   Recent Labs     06/21/19  1341   WBC 10.6   HGB 13.7*   HCT 39.0*        Recent Labs     06/21/19  1341      K 4.3      CO2 26   BUN 17   CREATININE 1.16   CALCIUM 9.1     Recent Labs     06/21/19  1341   AST 20   ALT 14   BILITOT 0.5   ALKPHOS 53     Recent Labs     06/19/19  1407 06/21/19  1341   INR 2.4 2.5     Recent Labs     06/21/19  1341   CKTOTAL 57   TROPONINI <0.010       Urinalysis:   Lab Results   Component Value Date    NITRU Negative 08/09/2018    WBCUA 20-50 08/09/2018    BACTERIA Many 08/09/2018    RBCUA 0-2 08/09/2018    BLOODU SMALL 08/09/2018    SPECGRAV 1.013 08/09/2018    GLUCOSEU Negative 08/09/2018       Radiology:   Most recent    Chest CT      WITH CONTRAST:No results found for this or any previous visit. WITHOUT CONTRAST: No results found for this or any previous visit.     CXR      2-view:   Results for orders placed during the hospital encounter of 08/09/18   XR CHEST STANDARD (2 VW)    Narrative XR CHEST (2 VW), XR ABDOMEN (KUB) (SINGLE AP VIEW) : 8/9/2018    CLINICAL HISTORY: Cough, weakness, nausea, vomiting, and diarrhea. COMPARISON: Two-view chest 7/15/2016, and CT abdomen and pelvis 2/16/2018. TECHNIQUE: Upright PA and lateral radiographs of the chest, and two supine radiographs of the abdomen and pelvis were obtained. FINDINGS:    There are no significant pulmonary infiltrates, pleural effusion, cardiomegaly, vascular congestion, pneumothorax, or displaced fractures identified. A left subclavian approach triple lead pacemaker appears unchanged. Mild to moderate scattered small and large bowel gas is suggestive of an ileus. There is no significant constipation, evidence of megacolon, obvious pneumoperitoneum, or other findings of concern identified. Impression NO EVIDENCE OF ACTIVE CARDIOPULMONARY DISEASE. PROBABLE MILD ILEUS. Portable:   Results for orders placed during the hospital encounter of 06/21/19   XR CHEST PORTABLE    Narrative Portable chest radiograph    History: Syncope    Technique: AP portable view of the chest obtained. Comparison: Chest radiograph from April 29, 2019    Findings:    Left-sided pacemaker is present. The cardiomediastinal silhouette is within normal limits. No pneumothorax, pleural effusion, or focal consolidation. Osseous structures of the thorax appear intact. Impression No acute intrathoracic process. Echo No results found for this or any previous visit. Assessment/Plan:    Syncope without collapse , etiology unclear:  hold Cardio consult for now. Echo ordered, check orthostatic VS, telemetry. Neuro consulted, neuro checks Q4hrs, Seizure precautions. Fall precautions. Up with assist only. Dementia without behavioral disorder: reorient PRN. Avoiding BEERS Criteria meds    A fib: rate controlled. Pacemaker. Goal K and Mg 4.0 and 2.0, respectively. On Long term anticoagulation, consult pharmacy for management of coumadin     I personally spent estimated 35 minutes with this patient today.      Additional work up

## 2019-06-21 NOTE — ED TRIAGE NOTES
Pt arrived to ER via EMS with c/o syncopal episode. Per wife pt was sitting at the kitchen table and had eating his breakfast when he began to get pale, cold, and clammy. Pt began to faint. Squad gave some fluids and patient was perked up when he arrived. Pt A&Ox1 (dementia), skin p/w/d. resp even and unlabored.

## 2019-06-22 ENCOUNTER — APPOINTMENT (OUTPATIENT)
Dept: ULTRASOUND IMAGING | Age: 84
End: 2019-06-22
Payer: MEDICARE

## 2019-06-22 VITALS
HEART RATE: 77 BPM | TEMPERATURE: 98.2 F | DIASTOLIC BLOOD PRESSURE: 65 MMHG | WEIGHT: 190.3 LBS | SYSTOLIC BLOOD PRESSURE: 135 MMHG | RESPIRATION RATE: 16 BRPM | OXYGEN SATURATION: 99 % | HEIGHT: 72 IN | BODY MASS INDEX: 25.78 KG/M2

## 2019-06-22 LAB
ALBUMIN SERPL-MCNC: 3.3 G/DL (ref 3.5–4.6)
ALP BLD-CCNC: 46 U/L (ref 35–104)
ALT SERPL-CCNC: 14 U/L (ref 0–41)
ANION GAP SERPL CALCULATED.3IONS-SCNC: 11 MEQ/L (ref 9–15)
AST SERPL-CCNC: 20 U/L (ref 0–40)
BILIRUB SERPL-MCNC: 0.5 MG/DL (ref 0.2–0.7)
BUN BLDV-MCNC: 19 MG/DL (ref 8–23)
CALCIUM SERPL-MCNC: 9.2 MG/DL (ref 8.5–9.9)
CHLORIDE BLD-SCNC: 105 MEQ/L (ref 95–107)
CO2: 23 MEQ/L (ref 20–31)
CREAT SERPL-MCNC: 0.9 MG/DL (ref 0.7–1.2)
GFR AFRICAN AMERICAN: >60
GFR NON-AFRICAN AMERICAN: >60
GLOBULIN: 2.9 G/DL (ref 2.3–3.5)
GLUCOSE BLD-MCNC: 89 MG/DL (ref 70–99)
INR BLD: 2.5
LV EF: 65 %
LVEF MODALITY: NORMAL
POTASSIUM REFLEX MAGNESIUM: 4.3 MEQ/L (ref 3.4–4.9)
PROTHROMBIN TIME: 24.3 SEC (ref 9–11.5)
SODIUM BLD-SCNC: 139 MEQ/L (ref 135–144)
TOTAL PROTEIN: 6.2 G/DL (ref 6.3–8)

## 2019-06-22 PROCEDURE — 97162 PT EVAL MOD COMPLEX 30 MIN: CPT

## 2019-06-22 PROCEDURE — 85610 PROTHROMBIN TIME: CPT

## 2019-06-22 PROCEDURE — 93880 EXTRACRANIAL BILAT STUDY: CPT

## 2019-06-22 PROCEDURE — 80053 COMPREHEN METABOLIC PANEL: CPT

## 2019-06-22 PROCEDURE — 97167 OT EVAL HIGH COMPLEX 60 MIN: CPT

## 2019-06-22 PROCEDURE — G0378 HOSPITAL OBSERVATION PER HR: HCPCS

## 2019-06-22 PROCEDURE — 2580000003 HC RX 258: Performed by: PHYSICIAN ASSISTANT

## 2019-06-22 PROCEDURE — 93306 TTE W/DOPPLER COMPLETE: CPT

## 2019-06-22 PROCEDURE — 36415 COLL VENOUS BLD VENIPUNCTURE: CPT

## 2019-06-22 PROCEDURE — 6370000000 HC RX 637 (ALT 250 FOR IP): Performed by: INTERNAL MEDICINE

## 2019-06-22 PROCEDURE — 2580000003 HC RX 258: Performed by: NURSE PRACTITIONER

## 2019-06-22 PROCEDURE — 6370000000 HC RX 637 (ALT 250 FOR IP): Performed by: PSYCHIATRY & NEUROLOGY

## 2019-06-22 RX ORDER — ACETAMINOPHEN 80 MG
TABLET,CHEWABLE ORAL ONCE
Status: COMPLETED | OUTPATIENT
Start: 2019-06-22 | End: 2019-06-22

## 2019-06-22 RX ORDER — ASPIRIN 81 MG/1
81 TABLET, CHEWABLE ORAL DAILY
Qty: 30 TABLET | Refills: 3 | Status: SHIPPED | OUTPATIENT
Start: 2019-06-23

## 2019-06-22 RX ORDER — ASPIRIN 81 MG/1
81 TABLET, CHEWABLE ORAL DAILY
Status: DISCONTINUED | OUTPATIENT
Start: 2019-06-22 | End: 2019-06-22 | Stop reason: HOSPADM

## 2019-06-22 RX ORDER — RISPERIDONE 0.25 MG/1
0.25 TABLET, FILM COATED ORAL 3 TIMES DAILY
Status: DISCONTINUED | OUTPATIENT
Start: 2019-06-22 | End: 2019-06-22 | Stop reason: HOSPADM

## 2019-06-22 RX ADMIN — RISPERIDONE 0.25 MG: 0.25 TABLET, FILM COATED ORAL at 16:05

## 2019-06-22 RX ADMIN — DONEPEZIL HYDROCHLORIDE 5 MG: 5 TABLET, FILM COATED ORAL at 10:13

## 2019-06-22 RX ADMIN — Medication 10 ML: at 10:14

## 2019-06-22 RX ADMIN — Medication 12.5 MG: at 10:13

## 2019-06-22 RX ADMIN — FINASTERIDE 5 MG: 5 TABLET, FILM COATED ORAL at 10:13

## 2019-06-22 RX ADMIN — RISPERIDONE 0.5 MG: 0.5 TABLET, FILM COATED ORAL at 02:07

## 2019-06-22 RX ADMIN — RISPERIDONE 0.25 MG: 0.25 TABLET, FILM COATED ORAL at 10:14

## 2019-06-22 RX ADMIN — PAROXETINE 10 MG: 10 TABLET, FILM COATED ORAL at 02:03

## 2019-06-22 RX ADMIN — Medication: at 02:02

## 2019-06-22 RX ADMIN — ASPIRIN 81 MG 81 MG: 81 TABLET ORAL at 16:04

## 2019-06-22 RX ADMIN — SODIUM CHLORIDE: 9 INJECTION, SOLUTION INTRAVENOUS at 03:15

## 2019-06-22 RX ADMIN — Medication 10 ML: at 03:15

## 2019-06-22 RX ADMIN — Medication 12.5 MG: at 02:03

## 2019-06-22 NOTE — PROGRESS NOTES
[facility] OCCUPATIONAL THERAPY EVALUATION - ACUTE     Date: 2019  Patient Name: Karina Carlton        MRN: [id number]  Account: [de-identified]   : 1935  (81 y.o.)  Room: [place] [address]-    Chart Review:  Diagnosis:  The primary encounter diagnosis was Syncope and collapse. A diagnosis of Transient hypotension was also pertinent to this visit. Past Medical History:   Diagnosis Date    Annual physical exam 10/27/2011    Atrial fibrillation ([address]     Benign prostatic hypertrophy without urinary obstruction     Gout     History of colonoscopy 12 & 2005    Beatriz Irvin    Hypertension     Hyperthyroidism     Osteoarthritis     Pacemaker     Sinoatrial node dysfunction [facility])      Past Surgical History:   Procedure Laterality Date    ABDOMEN SURGERY      CARDIAC SURGERY Left 2016    L ARM VENOGRAM-PIN PLACEMENT IN ATRIAL PORT    COLONOSCOPY      Dr Beatriz Irvin    CYSTOURETHROSCOPY  14    HERNIA REPAIR Left 13    Repair of recurrent LIH    PACEMAKER PLACEMENT      TOTAL KNEE ARTHROPLASTY Right     UPPER GASTROINTESTINAL ENDOSCOPY      Dr Beatriz Irvin       Restrictions  Restrictions/Precautions: Fall Risk     Safety Devices: Safety Devices  Safety Devices in place: Yes  Type of devices:  All fall risk precautions in place    Subjective       Pain Reassessment:   Pain Assessment  Patient Currently in Pain: Denies       Orientation  Orientation  Overall Orientation Status: Impaired  Orientation Level: Oriented to person    Prior Level of Function:  Social/Functional History  Lives With: Spouse(daughters and sons assist, taking turns)  Type of Home: House  Home Layout: Multi-level(split - pt's living quarters arranged on lower level which is accessible by 6steps with HR)  Home Access: Level entry  Bathroom Shower/Tub: Walk-in shower  Bathroom Toilet: Standard  Bathroom Equipment: 3-in-1 commode, Shower chair  ADL Assistance: Needs assistance(wife reported pt clothing?: A Little  How much help for taking care of personal grooming?: A Little  How much help for eating meals?: A Little  AM-PAC Inpatient Daily Activity Raw Score: 15  AM-PAC Inpatient ADL T-Scale Score : 34.69  ADL Inpatient CMS 0-100% Score: 56.46    Plan:  Plan  Times per week: 1-3  Current Treatment Recommendations: Balance Training, Functional Mobility Training, Endurance Training, Safety Education & Training, Cognitive/Perceptual Training, Equipment Evaluation, Education, & procurement, Patient/Caregiver Education & Training, Self-Care / ADL, Cognitive Reorientation    Goals:   Patient will:    - Be Set up  in UB ADLs   - Be Mod A in LB ADLs  - Be CGA in ADL transfers without LOB  - Access appropriate D/C site with as few architectural barriers as possible. - Sequence self-care tasks with Mod verbal cues   - Other :  Pt will utilize brushing protocol to increase tolerance to sensory input to increase participation with showers. Therapy Time:   OT Individual Minutes  Time In: 0580  Time Out: [facility]  Minutes: 24    Electronically signed by:     HEMANT Thakkar  6/22/2019, 3:39 PM

## 2019-06-22 NOTE — PROGRESS NOTES
Progress Note  NEUROLOGY  [facility] 2W Ortho Tele       Patient: Corinne Conrad  Unit/Bed: A821/V582-27  YOB: 1935  MRN: [id number]  Acct: [de-identified]   Admitting Diagnosis: Atypical syncope [R55]  Admit Date:  6/21/2019  Hospital Day: 0    Subjective: The patient has had another stereotyped event very similar to what he had the end of April, he was sitting he told his wife that he had a headache he  the left side of his head and then he started sweating and he is slumped. Again when the paramedics arrived his blood pressure was really low 23Q systolic    His INR was therapeutic at the time    It seen the patient recently in the office, and we had a discussion about stopping the Coumadin due to his frequent falls. Additionally he is becoming less redirectable when it comes to balance and mobility and therefore his risk for continued falls is pretty high. I had the opportunity to talk to Dr. Heather Arellano yesterday, he explained the patient is not been in A. fib for 2 years. Patient Seen, Chart, Labs, Radiology studies, and Consults reviewed. Objective:   /65   Pulse 77   Temp 98.2 °F (36.8 °C) (Oral)   Resp 16   Ht 6' (1.829 m)   Wt 190 lb 4.8 oz (86.3 kg)   SpO2 99%   BMI 25.81 kg/m²       Intake/Output Summary (Last 24 hours) at 6/22/2019 1319  Last data filed at 6/22/2019 1258  Gross per 24 hour   Intake 1962 ml   Output --   Net 1962 ml       Physical Exam:  GENERAL APPEARANCE: No distress, alert, interactive and cooperative. MENTAL STATE: Oriented to self, not to year, not to location. Naming is well preserved, he likes to joke, he is able to feed himself. Fund of knowledge is impaired    CRANIAL NERVES: Are normal  CN 2 Visual fields full to confrontation. CN 3, 4, 6 Pupils round, equally reactive to light. No ptosis. EOMs normal alignment, full range with normal saccades, pursuit and convergence. No nystagmus. CN 5 Facial sensation intact bilaterally.    CN 7 Normal and symmetric facial strength. Nasolabial folds symmetric. CN 8 Hearing intact to finger rub bilaterally. CN 9 Palate elevates symmetrically. CN 11 Bilaterally normal strength of shoulder shrug and neck turning. CN 12 Tongue midline, with normal bulk and strength; no fasciculations. MOTOR:Muscle strength was 5/5 in distal and proximal muscles in both upper and lower extremities. No fasciculations, tremor or other abnormal movements were present. COORDINATION: Coordination exam was normal. In both upper extremities, finger-nose-finger was intact without dysmetria. Medications:    risperiDONE  0.25 mg Oral TID    sodium chloride flush  10 mL Intravenous 2 times per day    donepezil  5 mg Oral Daily    finasteride  5 mg Oral Daily    metoprolol tartrate  12.5 mg Oral BID    PARoxetine  10 mg Oral Nightly     Continuous Infusions:  PRN Meds:sodium chloride flush, magnesium hydroxide, ondansetron, acetaminophen    LABS  CBC:   Recent Labs     06/21/19  1341   WBC 10.6   HGB 13.7*        BMP:    Recent Labs     06/21/19  1334 06/21/19  1341 06/22/19  0515   NA  --  140 139   K  --  4.3 4.3   CL  --  102 105   CO2  --  26 23   BUN  --  17 19   CREATININE  --  1.16 0.90   GLUCOSE 96 123* 89     TSH:  No results for input(s): TSH in the last 72 hours. B12:  No results for input(s): Yeni Catracho in the last 72 hours. Vit. D: No results for input(s): VITD25 in the last 72 hours.    Lipids:   Lab Results   Component Value Date    CHOL 142 09/13/2017    CHOL 166 01/30/2017    CHOL 160 08/10/2016     Lab Results   Component Value Date    TRIG 124 09/13/2017    TRIG 106 01/30/2017    TRIG 109 08/10/2016     Lab Results   Component Value Date    HDL 53 09/13/2017    HDL 67 (H) 01/30/2017    HDL 54 08/10/2016     Lab Results   Component Value Date    LDLCALC 64 09/13/2017    LDLCALC 78 01/30/2017    LDLCALC 84 08/10/2016     No results found for: LABVLDL, VLDL  Lab Results   Component 6/22/2019 at 1:19 PM

## 2019-06-22 NOTE — FLOWSHEET NOTE
Patient slept well. ; has been up in the chair all day with family at bedside. Alert to self and birthday. Follow command but slow to respond. Takes meds crush with applesause. PT in the morning with therapy. Wife hoping to have patient go home. Dr Reyes Kick visited.

## 2019-06-22 NOTE — CONSULTS
Consults     Sahara Duke is a 81 y.o.  melena and attended white male who presents to the emergency department plaint of a syncopal episode while sitting at the kitchen table. States she was sitting with the patient he complained of frontal head pain, she said he seemed to get pale and diaphoretic and then had a global weakness  only 2 minutes. She states he was still slow and lethargic after he started coming around EMS arrival they said he was hypotensive with systolic pressures in the 70s. Wife said he had a similar episode couple months back but with no specific diagnosis. She states that he did have a fall 2 days ago which was unwitnessed by her she is not sure whether he struck his head or not, he is currently on Coumadin for anticoagulation. INR was in good range     his blood pressure is being watched  He has a pacemaker and follows up with the cardiologist    HPI     NursingNotes were reviewed.     REVIEW OF SYSTEMS    (2-9 systems for level 4, 10 or more for level 5)      Review of Systems   Constitutional: Negative for activity change and appetite change. HENT: Negative for congestion, ear discharge, ear pain, nosebleeds, rhinorrhea and sore throat. Eyes: Negative for discharge. Respiratory: Negative for shortness of breath. Cardiovascular: Negative for chest pain, palpitations and leg swelling. Gastrointestinal: Negative for abdominal distention, abdominal pain and constipation. Genitourinary: Negative for difficulty urinating and dysuria. Musculoskeletal: Negative for arthralgias. Skin: Negative for color change, pallor, rash and wound. Neurological: Positive for syncope and headaches. Negative for dizziness, tremors and numbness.    Psychiatric/Behavioral: Negative for agitation and confusion.         Except as noted above the remainder of the review of systems was reviewed and negative.         PAST MEDICAL HISTORY      Past Medical History        Past Medical History: Diagnosis Date    Annual physical exam 10/27/2011    Atrial fibrillation [facility])      Benign prostatic hypertrophy without urinary obstruction      Gout      History of colonoscopy 11/7/12 & 6/9/2005     [place]    Hypertension      Hyperthyroidism      Osteoarthritis      Pacemaker      Sinoatrial node dysfunction (HCC)                 SURGICALHISTORY        Past Surgical History         Past Surgical History:   Procedure Laterality Date    ABDOMEN SURGERY        CARDIAC SURGERY Left 07/14/2016     L ARM VENOGRAM-PIN PLACEMENT IN ATRIAL PORT    COLONOSCOPY   2012     Dr Ebony Odom    CYSTOURETHROSCOPY   11/05/14    HERNIA REPAIR Left 7/16/13     Repair of recurrent LIH    PACEMAKER PLACEMENT   4/16 6/16    TOTAL KNEE ARTHROPLASTY Right 2014    UPPER GASTROINTESTINAL ENDOSCOPY   2012     Dr Ebony Odom               CURRENT MEDICATIONS             Previous Medications     ALLOPURINOL (ZYLOPRIM) 300 MG TABLET    TAKE ONE-HALF (1/2) TABLET DAILY     CALCIUM CARBONATE-VITAMIN D (CALCIUM 500 + D PO)    Take 1 tablet by mouth daily.       CHOLECALCIFEROL (VITAMIN D3) 2000 UNITS CAPS    Take 1,000 mg by mouth daily.     DONEPEZIL (ARICEPT) 5 MG TABLET    Take 5 mg by mouth nightly     FINASTERIDE (PROSCAR) 5 MG TABLET    Take 1 tablet by mouth daily     FISH OIL OIL    by Does not apply route 360 mg one capsule daily     GLUCOSAMINE-CHONDROITIN 500-400 MG CAPS    Take 1 capsule by mouth daily.       MAGNESIUM OXIDE (MAG-OX) 400 MG TABLET    Take 400 mg by mouth daily     METOPROLOL (LOPRESSOR) 25 MG TABLET    Take 25 mg by mouth 2 times daily      MULTIPLE VITAMIN (TAB-A-YVETTE PO)    Take 1 tablet by mouth daily.       PAROXETINE (PAXIL) 10 MG TABLET    Take 10 mg by mouth every morning     PROBIOTIC PRODUCT (ALIGN PO)    Take by mouth daily     QUETIAPINE (SEROQUEL) 25 MG TABLET    Take 50 mg by mouth 2 times daily      RISPERIDONE (RISPERDAL) 0.5 MG TABLET    Take 0.5 mg by mouth 3 times daily      SPINE, 5VIEWS CLINICAL HISTORY: Neck and back pain after syncope COMPARISONS: None available. TECHNIQUE: AP, lateral, bilateral oblique, and odontoid views of the cervical spine performed FINDINGS: Straightening of the cervical lordosis. Atlantodental interval is preserved. Multilevel degenerative endplate changes with spurring most significant C5-6 and C6-7. Multilevel facet arthropathy and uncovertebral hypertrophy. Lateral masses of C1 articulate symmetrically with C2. Osseous neural foraminal stenosis on the right at C4-5 and C5-6. Prevertebral soft tissues have a normal appearance. Multilevel degenerative changes of the cervical spine without acute fracture or malalignment identified by radiography. Ct Head Wo Contrast    Result Date: 6/21/2019  EXAMINATION: CT HEAD WO CONTRAST CLINICAL HISTORY: fall 2 days ago COMPARISON:  MAY 25, 2018 An unenhanced scan is performed. FINDINGS:   There is no bleed, mass effect, or space occupying lesion. No extra-axial mass or fluid collections. Chronic inflammatory changes in the maxillary sinus cavities, bilaterally, remaining stable and unchanged 5/25/2018. Skull base intact. NO ACUTE PROCESS IN THE BRAIN. All CT scans at this facility use dose modulation, iterative reconstruction, and/or weight based dosing when appropriate to reduce radiation dose to as low as reasonably achievable. Xr Chest Portable    Result Date: 6/21/2019  Portable chest radiograph History: Syncope Technique: AP portable view of the chest obtained. Comparison: Chest radiograph from April 29, 2019 Findings: Left-sided pacemaker is present. The cardiomediastinal silhouette is within normal limits. No pneumothorax, pleural effusion, or focal consolidation. Osseous structures of the thorax appear intact. No acute intrathoracic process.       Labs    CBC:   Recent Labs     06/21/19  1341   WBC 10.6   HGB 13.7*        BMP:    Recent Labs     06/21/19  1334 06/21/19  1341   NA  -- 140   K  --  4.3   CL  --  102   CO2  --  26   BUN  --  17   CREATININE  --  1.16   GLUCOSE 96 123*     TSH: No results for input(s): TSH in the last 72 hours. Folic Acid: No results for input(s): FOLATE in the last 72 hours. B12:    Lab Results   Component Value Date    NIYJFFXS35 1098 07/19/2018     Vit. D: No components found for: VITAMIND  Lipids: No results for input(s): CHOL, TRIG, HDL, LDLCALC in the last 72 hours. Ammonia: No results for input(s): AMMONIA in the last 72 hours. LFT:   Recent Labs     06/21/19  1341   AST 20   ALT 14   BILITOT 0.5   ALKPHOS 53        Urine: No results for input(s): COLORU, PHUR, LABCAST, WBCUA, RBCUA, MUCUS, YEAST, BACTERIA, CLARITYU, SPECGRAV, LEUKOCYTESUR, UROBILINOGEN, BILIRUBINUR, BLOODU in the last 72 hours. Invalid input(s): NITRATE, GLUCOSEUKETONESUAMORPHOUS       /81   Pulse 72   Temp 97.8 °F (36.6 °C) (Axillary)   Resp 20   Ht 6' (1.829 m)   Wt 196 lb 8 oz (89.1 kg)   SpO2 100%   BMI 26.65 kg/m²    Systemic Exam    All the peripheral pulsations are normal    No bruit are heard over the eyeballs, head, neck, abdominal,aorta,iliacs or the femorals. Heart is regular with normal heart sounds and no murmurs. There is no hepatosplenomegaly. He has a pacemaker    Neurological Examination    On Neurological examination, Lori Cummings is not oriented to day,date, month and the year. Speech, comprehension and expression is intact, he had difficulty following commands at times  Higher cortical functions are in. For patient's age. Both the pupils are equal and react to light. Visual fields are full    Extraocular movements are full and the fundi are normal.    Face is symmetrical.    The rest of the cranial nerves are within normal limits. He has increased in the muscle tone and normal muscle mass. No fasciculations or involuntary movements are seen. On [place] testing, there is no pronation or drift.     The power is normal in all the

## 2019-06-22 NOTE — PROGRESS NOTES
Hospitalist Progress Note      PCP: Shilpa Ruiz MD    Date of Admission: 6/21/2019    Chief Complaint:    Chief Complaint   Patient presents with    Loss of Consciousness     Pt was sitting at kitchen table when he stated to get cool, clammy, and pale     Subjective:  Patient denies fevers, chills, sweats, CP, SOB; patient is pleasantly confused. 12 point ROS negative other than mentioned above     Medications:  Reviewed    Infusion Medications    sodium chloride 125 mL/hr at 06/22/19 0315     Scheduled Medications    risperiDONE  0.25 mg Oral TID    sodium chloride flush  10 mL Intravenous 2 times per day    warfarin (COUMADIN) daily dosing (placeholder)   Other RX Placeholder    warfarin  1 mg Oral Once    donepezil  5 mg Oral Daily    finasteride  5 mg Oral Daily    metoprolol tartrate  12.5 mg Oral BID    PARoxetine  10 mg Oral Nightly     PRN Meds: sodium chloride flush, magnesium hydroxide, ondansetron, acetaminophen      Intake/Output Summary (Last 24 hours) at 6/22/2019 1135  Last data filed at 6/21/2019 1632  Gross per 24 hour   Intake 500 ml   Output --   Net 500 ml     Exam:    /65   Pulse 77   Temp 98.2 °F (36.8 °C) (Oral)   Resp 16   Ht 6' (1.829 m)   Wt 190 lb 4.8 oz (86.3 kg)   SpO2 99%   BMI 25.81 kg/m²     General appearance: No apparent distress, appears stated age and cooperative. HEENT:  Conjunctivae/corneas clear. Neck:  Trachea midline. Respiratory:  Normal respiratory effort. Clear to auscultation  Cardiovascular: Regular rate and rhythm   Abdomen: Soft, non-tender, non-distended with normal bowel sounds. Musculoskeletal: No clubbing, cyanosis or edema bilaterally.    Neuro: Non Focal.   Capillary Refill: Brisk,< 3 seconds   Peripheral Pulses: +2 palpable, equal bilaterally     Labs:   Recent Labs     06/21/19  1341   WBC 10.6   HGB 13.7*   HCT 39.0*        Recent Labs     06/21/19  1341 06/22/19  0515    139   K 4.3 4.3    105   CO2 26 23   BUN 17 19   CREATININE 1.16 0.90   CALCIUM 9.1 9.2     Recent Labs     06/21/19  1341 06/22/19  0515   AST 20 20   ALT 14 14   BILITOT 0.5 0.5   ALKPHOS 53 46     Recent Labs     06/19/19  1407 06/21/19  1341 06/22/19  0515   INR 2.4 2.5 2.5     Recent Labs     06/21/19  1341   CKTOTAL 62   TROPONINI <0.010       Urinalysis:      Lab Results   Component Value Date    NITRU Negative 08/09/2018    WBCUA 20-50 08/09/2018    BACTERIA Many 08/09/2018    RBCUA 0-2 08/09/2018    BLOODU SMALL 08/09/2018    SPECGRAV 1.013 08/09/2018    GLUCOSEU Negative 08/09/2018       Radiology:  XR CHEST PORTABLE   Final Result      No acute intrathoracic process. XR CERVICAL SPINE (4-5 VIEWS)   Final Result      Multilevel degenerative changes of the cervical spine without acute fracture or malalignment identified by radiography. CT Head WO Contrast   Final Result      NO ACUTE PROCESS IN THE BRAIN. All CT scans at this facility use dose modulation, iterative reconstruction, and/or weight based dosing when appropriate to reduce radiation dose to as low as reasonably achievable. Assessment/Plan:    #Near syncope     - Possibly related to hypotension; orthostatics negative; BP WNL today     - Will monitor until tomorrow; if no further incidences then will discharge pending PT? OT    #A Fib     - Continue coumadin    #HTN     - Continue lopressor    Active Hospital Problems    Diagnosis Date Noted    Atypical syncope [R55] 06/21/2019       Additional work up or/and treatment plan may be added today or then after based on clinical progression. I am managing a portion of pt care. Some medical issues are handled by other specialists. Additional work up and treatment should be done in out pt setting by pt PCP and other out pt providers. In addition to examining and evaluating pt, I spent additional time explaining care, normal and abnormal findings, and treatment plan. All of pt questions were answered.  Counseling, diet and education were  provided. Case will be discussed with nursing staff when appropriate. Family will be updated if and when appropriate.       Diet: DIET CARDIAC;    Code Status: DNR-CCA    PT/OT Eval     Electronically signed by Ricky Mas MD on 6/22/2019 at 11:35 AM No

## 2019-06-22 NOTE — PROGRESS NOTES
Assessment complete. See flow sheet. Patient alert to self, is slow to follow commands, and requires simple worded direction and redirection. Patient positioned for comfort. Family at bedside.

## 2019-06-22 NOTE — PROGRESS NOTES
Physical Therapy Med Surg Initial Assessment  Facility/Department: [address] [place] [address] [address]  Room: [place] [id number]       NAME: Zeina Corbin  : 1935 (81 y.o.)  MRN: [id number]  CODE STATUS: DNR-CCA    Date of Service: 2019    Patient Diagnosis(es): Atypical syncope [R55]   Chief Complaint   Patient presents with    Loss of Consciousness     Pt was sitting at kitchen table when he stated to get cool, clammy, and pale     Patient Active Problem List    Diagnosis Date Noted    Atypical syncope 2019    Osteoarthritis of finger of right hand 2015    Cervical strain 2015    Abnormal serum immunoelectrophoresis 2015    DJD (degenerative joint disease) of cervical spine 2015    Elevated PSA 2015    Benign prostatic hyperplasia 2015    Normocytic anemia 2014    S/P total knee arthroplasty 2014    Bleeding of penis 2014    Alzheimer's dementia 2014    Essential hypertension, benign 2014    Subclinical hypothyroidism 2014    Elevated TSH 2014    Enlarged prostate 2012    Cervicalgia 2012    Osteoarthritis 10/27/2011    Gout, chronic 10/27/2011    Atrial fibrillation ([address] 10/27/2011    Hyperlipidemia 10/27/2011    Pacemaker 10/27/2011    Sinoatrial node dysfunction ([address] 10/27/2011        Past Medical History:   Diagnosis Date    Annual physical exam 10/27/2011    Atrial fibrillation ([address]     Benign prostatic hypertrophy without urinary obstruction     Gout     History of colonoscopy 12 & 2005    [place]    Hypertension     Hyperthyroidism     Osteoarthritis     Pacemaker     Sinoatrial node dysfunction [facility])      Past Surgical History:   Procedure Laterality Date    ABDOMEN SURGERY      CARDIAC SURGERY Left 2016    L ARM VENOGRAM-PIN PLACEMENT IN ATRIAL PORT    COLONOSCOPY      Dr Fish Shine    CYSTOURETHROSCOPY  14    HERNIA REPAIR Left 13    Repair of

## 2019-06-22 NOTE — PROGRESS NOTES
[facility] Occupational Therapy      Date: 2019  Patient Name: Krupa Lennon        MRN: [id number]  Account: [de-identified]   : 1935  (81 y.o.)  Room: [place] [id number]-    Pt. is of observation status. To comply with medicare and insurance regulations, to see patient we require updated orders including a valid OT treatment diagnosis. Please reorder as appropriate.      Electronically signed by HEMANT Morris on 2019 at 11:07 AM

## 2019-06-22 NOTE — DISCHARGE INSTR - DIET

## 2019-06-23 NOTE — DISCHARGE SUMMARY
Hospital Medicine Discharge Summary    Jesus Simpson  :  1935  MRN:  [id number]    Admit date:  2019  Discharge date:  2019    Admitting Physician:  Royal Arjun MD  Primary Care Physician:  Saunders Mortimer, MD      Discharge Diagnoses: Active Problems:    Atypical syncope  Resolved Problems:    * No resolved hospital problems. *    Chief Complaint   Patient presents with    Loss of Consciousness     Pt was sitting at kitchen table when he stated to get cool, clammy, and pale     Hospital Course:   Jesus Simpson is a 81 y.o. male that was admitted and treated at [facility] for the following medical issues: Active Problems:    Atypical syncope  Resolved Problems:    * No resolved hospital problems. *    Patient presented with possible near syncope. Evaluated by neurology; decision made to stop his coumadin and to start lamictal. Pt was discharge in a stable condition. Exam on discharge:   /65   Pulse 77   Temp 98.2 °F (36.8 °C) (Oral)   Resp 16   Ht 6' (1.829 m)   Wt 190 lb 4.8 oz (86.3 kg)   SpO2 99%   BMI 25.81 kg/m²   General appearance: No apparent distress, appears stated age and cooperative. HEENT:  Conjunctivae/corneas clear. Neck:  Trachea midline. Respiratory:  Normal respiratory effort. Clear to auscultation  Cardiovascular: Regular rate and rhythm   Abdomen: Soft, non-tender, non-distended with normal bowel sounds. Musculoskeletal: No clubbing, cyanosis or edema bilaterally.    Neuro: Non Focal.   Capillary Refill: Brisk,< 3 seconds   Peripheral Pulses: +2 palpable, equal bilaterally          Patient was seen by the following consultants while admitted to [facility]:   Consults:  IP CONSULT TO HOSPITALIST  IP CONSULT TO NEUROLOGY    Significant Diagnostic Studies:    Xr Cervical Spine (4-5 Views)    Result Date: 2019  EXAMINATION: X-RAY: CERVICAL SPINE, 5VIEWS CLINICAL HISTORY: Neck and back pain after syncope COMPARISONS: None available. TECHNIQUE: AP, lateral, bilateral oblique, and odontoid views of the cervical spine performed FINDINGS: Straightening of the cervical lordosis. Atlantodental interval is preserved. Multilevel degenerative endplate changes with spurring most significant C5-6 and C6-7. Multilevel facet arthropathy and uncovertebral hypertrophy. Lateral masses of C1 articulate symmetrically with C2. Osseous neural foraminal stenosis on the right at C4-5 and C5-6. Prevertebral soft tissues have a normal appearance. Multilevel degenerative changes of the cervical spine without acute fracture or malalignment identified by radiography. Ct Head Wo Contrast    Result Date: 6/21/2019  EXAMINATION: CT HEAD WO CONTRAST CLINICAL HISTORY: fall 2 days ago COMPARISON:  MAY 25, 2018 An unenhanced scan is performed. FINDINGS:   There is no bleed, mass effect, or space occupying lesion. No extra-axial mass or fluid collections. Chronic inflammatory changes in the maxillary sinus cavities, bilaterally, remaining stable and unchanged 5/25/2018. Skull base intact. NO ACUTE PROCESS IN THE BRAIN. All CT scans at this facility use dose modulation, iterative reconstruction, and/or weight based dosing when appropriate to reduce radiation dose to as low as reasonably achievable. Xr Chest Portable    Result Date: 6/21/2019  Portable chest radiograph History: Syncope Technique: AP portable view of the chest obtained. Comparison: Chest radiograph from April 29, 2019 Findings: Left-sided pacemaker is present. The cardiomediastinal silhouette is within normal limits. No pneumothorax, pleural effusion, or focal consolidation. Osseous structures of the thorax appear intact. No acute intrathoracic process.       Discharge Medications:       [facility] Medication Instructions OIO:[id number]    Printed on:06/23/19 1411   Medication Information                      aspirin 81 MG chewable tablet  Take 1 tablet

## 2019-06-26 LAB
BLOOD CULTURE, ROUTINE: NORMAL
CULTURE, BLOOD 2: NORMAL

## 2019-07-03 ENCOUNTER — APPOINTMENT (OUTPATIENT)
Dept: MRI IMAGING | Age: 84
End: 2019-07-03
Payer: MEDICARE

## 2019-07-03 ENCOUNTER — HOSPITAL ENCOUNTER (OUTPATIENT)
Age: 84
Setting detail: OBSERVATION
Discharge: HOME HEALTH | End: 2019-07-06
Attending: INTERNAL MEDICINE | Admitting: INTERNAL MEDICINE
Payer: MEDICARE

## 2019-07-03 ENCOUNTER — APPOINTMENT (OUTPATIENT)
Dept: ULTRASOUND IMAGING | Age: 84
End: 2019-07-03
Payer: MEDICARE

## 2019-07-03 ENCOUNTER — APPOINTMENT (OUTPATIENT)
Dept: GENERAL RADIOLOGY | Age: 84
End: 2019-07-03
Payer: MEDICARE

## 2019-07-03 ENCOUNTER — APPOINTMENT (OUTPATIENT)
Dept: CT IMAGING | Age: 84
End: 2019-07-03
Payer: MEDICARE

## 2019-07-03 DIAGNOSIS — R47.81 SLURRING OF SPEECH: ICD-10-CM

## 2019-07-03 DIAGNOSIS — R29.898 WEAKNESS OF BOTH LOWER EXTREMITIES: ICD-10-CM

## 2019-07-03 DIAGNOSIS — G45.9 TIA (TRANSIENT ISCHEMIC ATTACK): Primary | ICD-10-CM

## 2019-07-03 LAB
ALBUMIN SERPL-MCNC: 3.6 G/DL (ref 3.5–4.6)
ALP BLD-CCNC: 81 U/L (ref 35–104)
ALT SERPL-CCNC: 40 U/L (ref 0–41)
ANION GAP SERPL CALCULATED.3IONS-SCNC: 13 MEQ/L (ref 9–15)
AST SERPL-CCNC: 40 U/L (ref 0–40)
BASOPHILS ABSOLUTE: 0 K/UL (ref 0–0.2)
BASOPHILS RELATIVE PERCENT: 0.3 %
BILIRUB SERPL-MCNC: 1 MG/DL (ref 0.2–0.7)
BUN BLDV-MCNC: 17 MG/DL (ref 8–23)
CALCIUM SERPL-MCNC: 9.2 MG/DL (ref 8.5–9.9)
CHLORIDE BLD-SCNC: 99 MEQ/L (ref 95–107)
CO2: 25 MEQ/L (ref 20–31)
CREAT SERPL-MCNC: 0.9 MG/DL (ref 0.7–1.2)
EKG ATRIAL RATE: 0 BPM
EKG Q-T INTERVAL: 384 MS
EKG QRS DURATION: 110 MS
EKG QTC CALCULATION (BAZETT): 519 MS
EKG R AXIS: 25 DEGREES
EKG T AXIS: -35 DEGREES
EKG VENTRICULAR RATE: 110 BPM
EOSINOPHILS ABSOLUTE: 0.2 K/UL (ref 0–0.7)
EOSINOPHILS RELATIVE PERCENT: 1.8 %
GFR AFRICAN AMERICAN: >60
GFR NON-AFRICAN AMERICAN: >60
GLOBULIN: 3.3 G/DL (ref 2.3–3.5)
GLUCOSE BLD-MCNC: 103 MG/DL
GLUCOSE BLD-MCNC: 103 MG/DL (ref 60–115)
GLUCOSE BLD-MCNC: 108 MG/DL (ref 70–99)
HCT VFR BLD CALC: 37.1 % (ref 42–52)
HEMOGLOBIN: 12.9 G/DL (ref 14–18)
LYMPHOCYTES ABSOLUTE: 1.6 K/UL (ref 1–4.8)
LYMPHOCYTES RELATIVE PERCENT: 14.6 %
MCH RBC QN AUTO: 33.4 PG (ref 27–31.3)
MCHC RBC AUTO-ENTMCNC: 34.7 % (ref 33–37)
MCV RBC AUTO: 96.1 FL (ref 80–100)
MONOCYTES ABSOLUTE: 0.9 K/UL (ref 0.2–0.8)
MONOCYTES RELATIVE PERCENT: 7.9 %
NEUTROPHILS ABSOLUTE: 8.3 K/UL (ref 1.4–6.5)
NEUTROPHILS RELATIVE PERCENT: 75.4 %
PDW BLD-RTO: 13.5 % (ref 11.5–14.5)
PERFORMED ON: NORMAL
PLATELET # BLD: 249 K/UL (ref 130–400)
POTASSIUM SERPL-SCNC: 4 MEQ/L (ref 3.4–4.9)
RBC # BLD: 3.85 M/UL (ref 4.7–6.1)
SODIUM BLD-SCNC: 137 MEQ/L (ref 135–144)
TOTAL CK: 42 U/L (ref 0–190)
TOTAL PROTEIN: 6.9 G/DL (ref 6.3–8)
TROPONIN: <0.01 NG/ML (ref 0–0.01)
WBC # BLD: 10.9 K/UL (ref 4.8–10.8)

## 2019-07-03 PROCEDURE — G0378 HOSPITAL OBSERVATION PER HR: HCPCS

## 2019-07-03 PROCEDURE — 71045 X-RAY EXAM CHEST 1 VIEW: CPT

## 2019-07-03 PROCEDURE — 36415 COLL VENOUS BLD VENIPUNCTURE: CPT

## 2019-07-03 PROCEDURE — 97166 OT EVAL MOD COMPLEX 45 MIN: CPT

## 2019-07-03 PROCEDURE — 96375 TX/PRO/DX INJ NEW DRUG ADDON: CPT

## 2019-07-03 PROCEDURE — 99285 EMERGENCY DEPT VISIT HI MDM: CPT

## 2019-07-03 PROCEDURE — 93005 ELECTROCARDIOGRAM TRACING: CPT | Performed by: PHYSICIAN ASSISTANT

## 2019-07-03 PROCEDURE — 97162 PT EVAL MOD COMPLEX 30 MIN: CPT

## 2019-07-03 PROCEDURE — 2580000003 HC RX 258: Performed by: PHYSICIAN ASSISTANT

## 2019-07-03 PROCEDURE — 6360000002 HC RX W HCPCS: Performed by: PHYSICIAN ASSISTANT

## 2019-07-03 PROCEDURE — 84484 ASSAY OF TROPONIN QUANT: CPT

## 2019-07-03 PROCEDURE — 6370000000 HC RX 637 (ALT 250 FOR IP): Performed by: PHYSICIAN ASSISTANT

## 2019-07-03 PROCEDURE — 93971 EXTREMITY STUDY: CPT

## 2019-07-03 PROCEDURE — 85025 COMPLETE CBC W/AUTO DIFF WBC: CPT

## 2019-07-03 PROCEDURE — 70450 CT HEAD/BRAIN W/O DYE: CPT

## 2019-07-03 PROCEDURE — 82550 ASSAY OF CK (CPK): CPT

## 2019-07-03 PROCEDURE — 6370000000 HC RX 637 (ALT 250 FOR IP): Performed by: SPECIALIST

## 2019-07-03 PROCEDURE — 80053 COMPREHEN METABOLIC PANEL: CPT

## 2019-07-03 PROCEDURE — 73552 X-RAY EXAM OF FEMUR 2/>: CPT

## 2019-07-03 PROCEDURE — 6370000000 HC RX 637 (ALT 250 FOR IP): Performed by: INTERNAL MEDICINE

## 2019-07-03 PROCEDURE — 93010 ELECTROCARDIOGRAM REPORT: CPT | Performed by: INTERNAL MEDICINE

## 2019-07-03 PROCEDURE — 96372 THER/PROPH/DIAG INJ SC/IM: CPT

## 2019-07-03 RX ORDER — RISPERIDONE 0.5 MG/1
0.5 TABLET, FILM COATED ORAL 3 TIMES DAILY
Status: DISCONTINUED | OUTPATIENT
Start: 2019-07-03 | End: 2019-07-06 | Stop reason: HOSPADM

## 2019-07-03 RX ORDER — LAMOTRIGINE 25 MG/1
25 TABLET ORAL NIGHTLY
Status: DISCONTINUED | OUTPATIENT
Start: 2019-07-03 | End: 2019-07-06

## 2019-07-03 RX ORDER — CLOPIDOGREL BISULFATE 75 MG/1
75 TABLET ORAL DAILY
Status: DISCONTINUED | OUTPATIENT
Start: 2019-07-03 | End: 2019-07-06 | Stop reason: HOSPADM

## 2019-07-03 RX ORDER — ASPIRIN 81 MG/1
324 TABLET, CHEWABLE ORAL ONCE
Status: COMPLETED | OUTPATIENT
Start: 2019-07-03 | End: 2019-07-03

## 2019-07-03 RX ORDER — KETOROLAC TROMETHAMINE 15 MG/ML
15 INJECTION, SOLUTION INTRAMUSCULAR; INTRAVENOUS ONCE
Status: COMPLETED | OUTPATIENT
Start: 2019-07-03 | End: 2019-07-03

## 2019-07-03 RX ORDER — PAROXETINE 10 MG/1
10 TABLET, FILM COATED ORAL NIGHTLY
Status: DISCONTINUED | OUTPATIENT
Start: 2019-07-03 | End: 2019-07-06 | Stop reason: HOSPADM

## 2019-07-03 RX ORDER — SODIUM CHLORIDE 0.9 % (FLUSH) 0.9 %
10 SYRINGE (ML) INJECTION PRN
Status: DISCONTINUED | OUTPATIENT
Start: 2019-07-03 | End: 2019-07-06 | Stop reason: HOSPADM

## 2019-07-03 RX ORDER — ONDANSETRON 2 MG/ML
4 INJECTION INTRAMUSCULAR; INTRAVENOUS EVERY 6 HOURS PRN
Status: DISCONTINUED | OUTPATIENT
Start: 2019-07-03 | End: 2019-07-06 | Stop reason: HOSPADM

## 2019-07-03 RX ORDER — ACETAMINOPHEN 80 MG
TABLET,CHEWABLE ORAL ONCE
Status: DISCONTINUED | OUTPATIENT
Start: 2019-07-03 | End: 2019-07-06 | Stop reason: HOSPADM

## 2019-07-03 RX ORDER — FINASTERIDE 5 MG/1
5 TABLET, FILM COATED ORAL DAILY
Status: DISCONTINUED | OUTPATIENT
Start: 2019-07-03 | End: 2019-07-06 | Stop reason: HOSPADM

## 2019-07-03 RX ORDER — MEMANTINE HYDROCHLORIDE 5 MG/1
5 TABLET ORAL NIGHTLY
Status: DISCONTINUED | OUTPATIENT
Start: 2019-07-03 | End: 2019-07-06 | Stop reason: HOSPADM

## 2019-07-03 RX ORDER — SODIUM CHLORIDE 0.9 % (FLUSH) 0.9 %
10 SYRINGE (ML) INJECTION EVERY 12 HOURS SCHEDULED
Status: DISCONTINUED | OUTPATIENT
Start: 2019-07-03 | End: 2019-07-06 | Stop reason: HOSPADM

## 2019-07-03 RX ORDER — ASPIRIN 81 MG/1
81 TABLET, CHEWABLE ORAL DAILY
Status: DISCONTINUED | OUTPATIENT
Start: 2019-07-03 | End: 2019-07-06 | Stop reason: HOSPADM

## 2019-07-03 RX ORDER — SODIUM CHLORIDE 9 MG/ML
INJECTION, SOLUTION INTRAVENOUS CONTINUOUS
Status: DISCONTINUED | OUTPATIENT
Start: 2019-07-03 | End: 2019-07-06 | Stop reason: HOSPADM

## 2019-07-03 RX ADMIN — METOPROLOL TARTRATE 12.5 MG: 25 TABLET ORAL at 22:12

## 2019-07-03 RX ADMIN — CLOPIDOGREL BISULFATE 75 MG: 75 TABLET ORAL at 22:11

## 2019-07-03 RX ADMIN — PAROXETINE 10 MG: 10 TABLET, FILM COATED ORAL at 22:11

## 2019-07-03 RX ADMIN — ASPIRIN 81 MG 324 MG: 81 TABLET ORAL at 10:34

## 2019-07-03 RX ADMIN — SODIUM CHLORIDE: 9 INJECTION, SOLUTION INTRAVENOUS at 08:11

## 2019-07-03 RX ADMIN — KETOROLAC TROMETHAMINE 15 MG: 15 INJECTION, SOLUTION INTRAMUSCULAR; INTRAVENOUS at 10:34

## 2019-07-03 RX ADMIN — ENOXAPARIN SODIUM 40 MG: 40 INJECTION SUBCUTANEOUS at 16:16

## 2019-07-03 RX ADMIN — SODIUM CHLORIDE: 9 INJECTION, SOLUTION INTRAVENOUS at 17:15

## 2019-07-03 RX ADMIN — LAMOTRIGINE 25 MG: 25 TABLET ORAL at 22:11

## 2019-07-03 ASSESSMENT — PAIN SCALES - GENERAL
PAINLEVEL_OUTOF10: 6
PAINLEVEL_OUTOF10: 8

## 2019-07-03 ASSESSMENT — ENCOUNTER SYMPTOMS
EYE DISCHARGE: 0
SHORTNESS OF BREATH: 0
SORE THROAT: 0
ABDOMINAL DISTENTION: 0
RHINORRHEA: 0
ABDOMINAL PAIN: 0
COLOR CHANGE: 0
CONSTIPATION: 0

## 2019-07-03 NOTE — CONSULTS
Musculoskeletal: Negative for arthralgias. Right femur pain x 2 days   Skin: Negative for color change. abrasion to left frontal forehead   Neurological: Negative for dizziness, syncope, numbness and headaches. Psychiatric/Behavioral: Positive for confusion.  Negative for agitation.         Except as noted above the remainder of the review of systems was reviewed and negative.         PAST MEDICAL HISTORY      Past Medical History        Past Medical History:   Diagnosis Date    Annual physical exam 10/27/2011    Atrial fibrillation [facility])      Benign prostatic hypertrophy without urinary obstruction      Gout      History of colonoscopy 11/7/12 & 6/9/2005     [place]    Hypertension      Hyperthyroidism      Osteoarthritis      Pacemaker      Sinoatrial node dysfunction (HCC)                 SURGICALHISTORY        Past Surgical History         Past Surgical History:   Procedure Laterality Date    ABDOMEN SURGERY        CARDIAC SURGERY Left 07/14/2016     L ARM VENOGRAM-PIN PLACEMENT IN ATRIAL PORT    COLONOSCOPY   2012     Dr Mika Sawant    CYSTOURETHROSCOPY   11/05/14    HERNIA REPAIR Left 7/16/13     Repair of recurrent LIH    PACEMAKER PLACEMENT   4/16 6/16    TOTAL KNEE ARTHROPLASTY Right 2014    UPPER GASTROINTESTINAL ENDOSCOPY   2012     Dr Mika Sawant               CURRENT MEDICATIONS             Previous Medications     ASPIRIN 81 MG CHEWABLE TABLET    Take 1 tablet by mouth daily     CALCIUM CARBONATE-VITAMIN D (CALCIUM 500 + D PO)    Take 1 tablet by mouth daily.       CHOLECALCIFEROL (VITAMIN D3) 2000 UNITS CAPS    Take 1,000 mg by mouth daily.     DONEPEZIL (ARICEPT) 5 MG TABLET    Take 5 mg by mouth daily      FINASTERIDE (PROSCAR) 5 MG TABLET    Take 1 tablet by mouth daily     LAMOTRIGINE (LAMICTAL)    Take 25 mLs by mouth nightly     MAGNESIUM OXIDE (MAG-OX) 400 MG TABLET    Take 400 mg by mouth daily     METOPROLOL (LOPRESSOR) 25 MG TABLET    Take 12.5 mg by mouth 2 artifacts present, which degrades the exam. FINDINGS: The ventricles are normal in position. No focal abnormalities are seen within the brain parenchyma. There is no evidence of mass effect. There is moderate dilatation of the cerebral sulci and ventricles. Minimal nonspecific changes periventricular deep white matter, unchanged. There is normal grey- white matter differentiation. There is no evidence of hemorrhage. There is thinning of corpus callosum. Soft tissue density fills the right maxillary sinus. Other paranasal sinuses are clear. Mastoid air cells visualized are clear. Views of the skull are unremarkable. The orbits are unremarkable. Right frontal scalp soft tissue swelling. The patient's finger overlies the left frontal bone and causes artifacts within the adjacent left frontal lobe. SUSPECT ARTIFACT TO THE LEFT FRONTAL LOBE. OTHERWISE, AGE-RELATED FINDINGS, NO ACUTE ABNORMALITY. ATROPHY AND CHRONIC SMALL VESSEL ISCHEMIC CHANGES DEEP WHITE MATTER. MOTION ARTIFACTS. SOFT TISSUES SWELLING RIGHT FRONTAL SCALP. All CT scans at this facility use dose modulation, iterative reconstruction, and/or weight based dosing when appropriate to reduce radiation dose to as low as reasonably achievable. Ct Head Wo Contrast    Result Date: 6/21/2019  EXAMINATION: CT HEAD WO CONTRAST CLINICAL HISTORY: fall 2 days ago COMPARISON:  MAY 25, 2018 An unenhanced scan is performed. FINDINGS:   There is no bleed, mass effect, or space occupying lesion. No extra-axial mass or fluid collections. Chronic inflammatory changes in the maxillary sinus cavities, bilaterally, remaining stable and unchanged 5/25/2018. Skull base intact. NO ACUTE PROCESS IN THE BRAIN. All CT scans at this facility use dose modulation, iterative reconstruction, and/or weight based dosing when appropriate to reduce radiation dose to as low as reasonably achievable.     Xr Chest Portable    Result Date: 7/3/2019  EXAM: X-ray right femur, 2 view HISTORY: Pain for 2 days TECHNIQUE: Frontal and lateral views of the right femur COMPARISON: None available FINDINGS: No acute or aggressive osseous abnormality of the right femur. No hip dislocation. Postsurgical changes of total knee arthroplasty without overt periprosthetic lucency or fracture. Soft tissues appear within normal limits. Vascular calcifications are noted. No acute osseous abnormality. Portable chest radiograph History: Confusion Technique: Supine AP portable view of the chest obtained. Comparison: Portable chest radiograph from June 21, 2019 Findings: Left-sided pacemaker is present. Atherosclerotic calcification of the thoracic aorta. Heart size is at the upper limits of normal. No pneumothorax, pleural effusion, or focal consolidation. Osseous structures of the thorax appear intact. IMPRESSION: No acute intrathoracic process. Xr Chest Portable    Result Date: 6/21/2019  Portable chest radiograph History: Syncope Technique: AP portable view of the chest obtained. Comparison: Chest radiograph from April 29, 2019 Findings: Left-sided pacemaker is present. The cardiomediastinal silhouette is within normal limits. No pneumothorax, pleural effusion, or focal consolidation. Osseous structures of the thorax appear intact. No acute intrathoracic process. Us Carotid Artery Bilateral    Result Date: 6/23/2019  US CAROTID ARTERY BILATERAL: 6/22/2019 CLINICAL HISTORY:  left sided headache with syncope . COMPARISON: Neck CT with contrast 9/28/2014. Grayscale, color and waveform Doppler analysis of the cervical carotid and vertebral arteries was performed. Validated velocity measurements with angiographic measurements, velocity criteria are extrapolated from diameter data as defined by the Society of Radiologist in [address] [place] [address] Radiology 2003; 474;121-989.  FINDINGS: There is mild to moderate heterogeneous atherosclerotic plaquing of the carotid bulbs without significantly pulsations are normal    No bruit are heard over the eyeballs, head, neck, abdominal,aorta,iliacs or the femorals. Heart is regular with normal heart sounds and no murmurs. There is no hepatosplenomegaly. Neurological Examination    On Neurological examination, Ge Bright is not oriented to day,date, month and the year. Patient cannot recognize the family. At times he has a difficulty interacting with the family and recognizing them    Speech, comprehension and expression is impaired  Higher cortical functions are impaired for patient's age. Both the pupils are equal and react to light. Visual fields are full    Extraocular movements are full and the fundi are normal.    Face is symmetrical.    The rest of the cranial nerves are within normal limits. He has increased muscle tone and normal muscle mass. No fasciculations or involuntary movements are seen. On [place] testing, there is no pronation or drift. The power is normal in all the muscle groups. The deep tendon reflexes are 1+ bilaterally symmetrical.    Both the plantar responses are flexor. The coordination is normal for the patient's age. No cortical release signs are seen. The patient's gait is unsteady    Romberg position: Could not be evaluated with the reliability      The patient has normal sensory examination for age            Active Problems:    TIA (transient ischemic attack)  Resolved Problems:    * No resolved hospital problems. *           IMPRESSION:  Patient has a gait ataxia and history of falls  His Coumadin has been discontinued. He is on aspirin 81 mg daily  Patient had a Plavix 75 million daily because of a history of atrial fibrillation. Permanent pacemaker  Dementia progressive  He can try the low-dose of Namenda 5 mg at night.   He did not tolerate most of the cognition modifying therapies small vessel cerebrovascular disease  Anemia  Benign prostatic hypertrophy  History of gout  Patient

## 2019-07-03 NOTE — CARE COORDINATION
LSW spoke with the pt and his wife. Pt has dementia so he could not answer any questions. Wife explained that for the past three days the pt has been in a recliner and unable to ambulate. It took 2 people to get him up to change him. Pt cannot return home until he can ambulate. We discussed DC needs and rehab choices and wife is agreeable to [facility] rehab or [facility]. LSW to follow.

## 2019-07-03 NOTE — PROGRESS NOTES
self-care)  Homemaking Assistance: Needs assistance  Homemaking Responsibilities: No  Ambulation Assistance: Independent  Transfer Assistance: Independent  Active : No  Additional Comments: Pt's daughter assists with PM care during the week and some AM care on the weekend    OBJECTIVE:     Orientation Status:  Orientation  Overall Orientation Status: Impaired  Orientation Level: Oriented to person    Observation:  Observation/Palpation  Posture: Poor  Observation: head forward and down  Scar: right knee    Cognition Status:  Cognition  Cognition Comment: follows one step commands inconsistently    Perception Status:  Perception  Overall Perceptual Status: WFL    Sensation Status:  Sensation  Overall Sensation Status: WFL    Vision and Hearing Status:  Vision  Vision: Impaired  Vision Exceptions: Wears glasses at all times  Hearing  Hearing: Exceptions to [facility]  Hearing Exceptions: Hard of hearing/hearing concerns     ROM:   LUE PROM (degrees)  LUE PROM: WFL  Left Hand PROM (degrees)  Left Hand PROM: WFL  RUE PROM (degrees)  RUE PROM: WFL  Right Hand PROM (degrees)  Right Hand PROM: WFL    Strength:  LUE Strength  LUE Strength Comment: unable to formally test  RUE Strength  RUE Strength Comment: unable to formally test    Coordination, Tone, Quality of Movement:    Tone RUE  RUE Tone: Normotonic  Tone LUE  LUE Tone: Normotonic  Coordination  Movements Are Fluid And Coordinated: No  Coordination and Movement description: Decreased speed, Left UE, Right UE, Gross motor impairments, Fine motor impairments    Hand Dominance:  Hand Dominance  Hand Dominance: Right    ADL Status:  ADL  Feeding: Dependent/Total  Grooming: Dependent/Total  UE Bathing: Dependent/Total  LE Bathing: Dependent/Total  UE Dressing: Dependent/Total  LE Dressing: Dependent/Total  Toileting: Dependent/Total          Functional Mobility:Unable to stand          Bed Mobility  Bed mobility  Supine to Sit: Dependent/Total  Sit to Supine:

## 2019-07-03 NOTE — ED PROVIDER NOTES
[address] [facility] ED  eMERGENCY dEPARTMENT eNCOUnter      Pt Name: Franklin Reyes  MRN: [id number]  [place] 1935  Date of evaluation: 7/3/2019  Provider: Timothy Frey PA-C    CHIEF COMPLAINT       Chief Complaint   Patient presents with    Extremity Weakness         HISTORY OF PRESENT ILLNESS   (Location/Symptom, Timing/Onset,Context/Setting, Quality, Duration, Modifying Factors, Severity)  Note limiting factors. Franklin Reyes is a 81 y.o. male who presents to the emergency department with a complaint of intermittent periods of confusion and slurred speech which wife states started on Saturday, 6/29/2019. She states patient does have a past history of dementia and is very difficult sometimes understand him but she states that his speech is intermittently been slurred she states this morning about 6AM when he got up he seemed more confused than normal and she had difficult time understanding him. She denies any acute injuries but states that he is complained of right lower extremity pain for approximate last 3 days and she states that there was a abrasion noted to his forehead, which she states is been present for approximately 2 days she believes this is a rug burn but she did not witness a fall. Wife also states increased weakness with ambulation she states this is abnormal for him she states he can normally ambulate on his own or with very minimal assistance, today could not stand or ambulate on his own. HPI    NursingNotes were reviewed. REVIEW OF SYSTEMS    (2-9 systems for level 4, 10 or more for level 5)     Review of Systems   Constitutional: Negative for activity change and appetite change. HENT: Negative for congestion, ear discharge, ear pain, nosebleeds, rhinorrhea and sore throat. Eyes: Negative for discharge. Respiratory: Negative for shortness of breath. Cardiovascular: Negative for chest pain, palpitations and leg swelling.    Gastrointestinal: Negative for by mouth 2 times daily     MULTIPLE VITAMIN (TAB-A-YVETTE PO)    Take 1 tablet by mouth daily. PAROXETINE (PAXIL) 10 MG TABLET    Take 10 mg by mouth nightly     PROBIOTIC PRODUCT (ALIGN PO)    Take by mouth daily    RISPERIDONE (RISPERDAL) 0.5 MG TABLET    Take 0.5 mg by mouth 3 times daily        ALLERGIES     Adhesive tape and Rivastigmine    FAMILY HISTORY       Family History   Problem Relation Age of Onset    Cancer Mother         ovarian    Sudden Death Father     Cancer Sister     Cancer Brother           SOCIAL HISTORY       Social History     Socioeconomic History    Marital status:      Spouse name: Jena Ch    Number of children: 4    Years of education: None    Highest education level: None   Occupational History    None   Social Needs    Financial resource strain: None    Food insecurity:     Worry: None     Inability: None    Transportation needs:     Medical: None     Non-medical: None   Tobacco Use    Smoking status: Never Smoker    Smokeless tobacco: Never Used   Substance and Sexual Activity    Alcohol use: Not Currently    Drug use: Not Currently    Sexual activity: Not Currently   Lifestyle    Physical activity:     Days per week: None     Minutes per session: None    Stress: None   Relationships    Social connections:     Talks on phone: None     Gets together: None     Attends [employer] service: None     Active member of club or organization: None     Attends meetings of clubs or organizations: None     Relationship status: None    Intimate partner violence:     Fear of current or ex partner: None     Emotionally abused: None     Physically abused: None     Forced sexual activity: None   Other Topics Concern    None   Social History Narrative    ADLs and IADLs intact               SCREENINGS   NIH Stroke Scale  NIH Stroke Scale Assessed: Yes  Interval: Baseline  Level of Consciousness (1a. ): Alert  LOC Questions (1b. ):  Incorrect(Pt has dementia)  LOC Commands upper limits of normal. No pneumothorax, pleural effusion, or focal consolidation. Osseous structures of the thorax appear intact. IMPRESSION:       No acute intrathoracic process. CT Head WO Contrast   Final Result   SUSPECT ARTIFACT TO THE LEFT FRONTAL LOBE. OTHERWISE, AGE-RELATED FINDINGS, NO ACUTE ABNORMALITY. ATROPHY AND CHRONIC SMALL VESSEL ISCHEMIC CHANGES DEEP WHITE MATTER. MOTION ARTIFACTS. SOFT TISSUES SWELLING RIGHT FRONTAL SCALP. All CT scans at this facility use dose modulation, iterative reconstruction, and/or weight based dosing when appropriate to reduce radiation dose to as low as reasonably achievable. ED BEDSIDE ULTRASOUND:   Performed by ED Physician - none    LABS:  Labs Reviewed   COMPREHENSIVE METABOLIC PANEL - Abnormal; Notable for the following components:       Result Value    Glucose 108 (*)     Total Bilirubin 1.0 (*)     All other components within normal limits   CBC WITH AUTO DIFFERENTIAL - Abnormal; Notable for the following components:    WBC 10.9 (*)     RBC 3.85 (*)     Hemoglobin 12.9 (*)     Hematocrit 37.1 (*)     MCH 33.4 (*)     Neutrophils # 8.3 (*)     Monocytes # 0.9 (*)     All other components within normal limits   POCT GLUCOSE - Normal   TROPONIN   CK   URINE RT REFLEX TO CULTURE   POCT GLUCOSE       All other labs were within normal range or not returned as of this dictation.     EMERGENCY DEPARTMENT COURSE and DIFFERENTIAL DIAGNOSIS/MDM:   Vitals:    Vitals:    07/03/19 0803   BP: (!) 155/83   Pulse: 90   Resp: 16   Temp: 97.5 °F (36.4 °C)   TempSrc: Tympanic   SpO2: 100%   Weight: 192 lb (87.1 kg)   Height: 6' (1.829 m)          MDM  Number of Diagnoses or Management Options  Slurring of speech:   TIA (transient ischemic attack):   Weakness of both lower extremities:   Diagnosis management comments: Patient presents emerged part with complaint of intermittent periods of speech difficulty, which wife states

## 2019-07-03 NOTE — ED TRIAGE NOTES
[facility] with a c/o general weakness. Per patients family he has been weak since 6/29/19. Patient is alert and speaking.  EMS Placed 20 Gauge in the left wrist.

## 2019-07-03 NOTE — PROGRESS NOTES
PACEMAKER PLACEMENT  4/16 6/16    TOTAL KNEE ARTHROPLASTY Right 2014    UPPER GASTROINTESTINAL ENDOSCOPY  2012    Dr Patricia Pena       Chart Reviewed: Yes  Patient assessed for rehabilitation services?: Yes  Family / Caregiver Present: Yes(spouse)  Other (Comment): Pt awake in bed, non-verbal most of eval; confused but participatory    Restrictions:  Restrictions/Precautions: Fall Risk     SUBJECTIVE:    Pre Treatment Pain Screening  Pain at present: 0(appears comfortable at rest)  Comments / Details: x-ray (-) for acute process    Post Treatment Pain Screening:   Pain Assessment  Pain Level: 6(faces- R thigh/hip)    Prior Level of Function:  Social/Functional History  Lives With: Spouse  Type of Home: House  Home Layout: Multi-level(pt typically stays on lower level with bathroom and hospital bed)  Home Access: Stairs to enter without rails  Entrance Stairs - Number of Steps: 1  Bathroom Shower/Tub: Walk-in shower(sponge bathes in bathroom)  Bathroom Equipment: Toilet raiser, Grab bars around toilet  Home Equipment: Hospital bed  Receives Help From: Family  ADL Assistance: (feeds self; set-up assist for self-care)  Homemaking Assistance: Needs assistance  Ambulation Assistance: Independent(no AD)  Transfer Assistance: Independent  Active : No  Additional Comments: has been unable to walk since Sunday; has 24/7 supervision from spouse    OBJECTIVE:   Vision/Hearing:  Vision: Impaired  Vision Exceptions: Wears glasses at all times  Hearing: Exceptions to [facility]  Hearing Exceptions: Hard of hearing/hearing concerns    Cognition:  Follows Commands: Impaired    Observation/Palpation  Posture: Fair(kyphotic)  Observation: head forward and down  Scar: right knee    ROM:  RLE General PROM: about 15* from full knee extension (though likely limited by cognition)  RLE General AROM: about 15* from full knee extension (though likely limited by cognition)    Strength:  Strength RLE  Comment: unable to assess  Strength LLE  Comment: unable to assess    Neuro:  Balance  Sitting - Static: Poor(unable to maintain static sitting at EOB)             Bed mobility  Supine to Sit: Dependent/Total  Sit to Supine: Dependent/Total  Comment: pt sat EOB about 10 minutes with varying assist from min-max A to maintain leaning towards L side    Transfers  Sit to Stand: Dependent/Total(attempted STS 3x, but pt was unable to come to full stand)    Ambulation  Ambulation?: No    Activity Tolerance  Activity Tolerance: Patient limited by cognitive status          ASSESSMENT:   Body structures, Functions, Activity limitations: Decreased functional mobility ; Decreased strength;Decreased endurance;Decreased safe awareness;Decreased cognition;Decreased balance;Decreased ROM  Decision Making: Medium Complexity  History: high  Exam: high  Clinical Presentation: medium    Prognosis: Good  Patient Education: PT POC, positioning, DC planning    DISCHARGE RECOMMENDATIONS:  Discharge Recommendations: Continue to assess pending progress, Patient would benefit from continued therapy after discharge    Assessment: Pt was previously ambulatory without AD around home. Per spouse, pt has been declining in mobility past few days and unable to safely navigate house. Pt now dependent with bed mobility, unable to transfer and will benefit from continued PT to address impairments and return to highest level of mobility.   REQUIRES PT FOLLOW UP: Yes      PLAN OF CARE:  Plan  Times per week: 3-6  Current Treatment Recommendations: Strengthening, Functional Mobility Training, Neuromuscular Re-education, Home Exercise Program, Equipment Evaluation, Education, & procurement, Transfer Training, Gait Training, Safety Education & Training, Balance Training, Endurance Training, Patient/Caregiver Education & Training, Manual Therapy - Soft Tissue Mobilization, Manual Therapy - Joint Manipulation  Safety Devices  Type of devices: Call light within reach, Bed alarm in

## 2019-07-04 ENCOUNTER — APPOINTMENT (OUTPATIENT)
Dept: CT IMAGING | Age: 84
End: 2019-07-04
Payer: MEDICARE

## 2019-07-04 LAB
ANION GAP SERPL CALCULATED.3IONS-SCNC: 13 MEQ/L (ref 9–15)
BACTERIA: NEGATIVE /HPF
BASOPHILS ABSOLUTE: 0 K/UL (ref 0–0.2)
BASOPHILS RELATIVE PERCENT: 0.4 %
BILIRUBIN URINE: NEGATIVE
BLOOD, URINE: ABNORMAL
BUN BLDV-MCNC: 22 MG/DL (ref 8–23)
CALCIUM SERPL-MCNC: 8.2 MG/DL (ref 8.5–9.9)
CHLORIDE BLD-SCNC: 103 MEQ/L (ref 95–107)
CLARITY: CLEAR
CO2: 21 MEQ/L (ref 20–31)
COLOR: ABNORMAL
CREAT SERPL-MCNC: 0.87 MG/DL (ref 0.7–1.2)
EOSINOPHILS ABSOLUTE: 0.4 K/UL (ref 0–0.7)
EOSINOPHILS RELATIVE PERCENT: 5.4 %
EPITHELIAL CELLS, UA: ABNORMAL /HPF (ref 0–5)
GFR AFRICAN AMERICAN: >60
GFR NON-AFRICAN AMERICAN: >60
GLUCOSE BLD-MCNC: 91 MG/DL (ref 70–99)
GLUCOSE URINE: NEGATIVE MG/DL
HCT VFR BLD CALC: 33.4 % (ref 42–52)
HEMOGLOBIN: 11.6 G/DL (ref 14–18)
HYALINE CASTS: ABNORMAL /HPF (ref 0–5)
KETONES, URINE: NEGATIVE MG/DL
LEUKOCYTE ESTERASE, URINE: ABNORMAL
LYMPHOCYTES ABSOLUTE: 1.7 K/UL (ref 1–4.8)
LYMPHOCYTES RELATIVE PERCENT: 21.6 %
MCH RBC QN AUTO: 33.2 PG (ref 27–31.3)
MCHC RBC AUTO-ENTMCNC: 34.6 % (ref 33–37)
MCV RBC AUTO: 95.9 FL (ref 80–100)
MONOCYTES ABSOLUTE: 0.8 K/UL (ref 0.2–0.8)
MONOCYTES RELATIVE PERCENT: 10.6 %
NEUTROPHILS ABSOLUTE: 4.8 K/UL (ref 1.4–6.5)
NEUTROPHILS RELATIVE PERCENT: 62 %
NITRITE, URINE: NEGATIVE
PDW BLD-RTO: 13.3 % (ref 11.5–14.5)
PH UA: 6.5 (ref 5–9)
PLATELET # BLD: 228 K/UL (ref 130–400)
POTASSIUM REFLEX MAGNESIUM: 3.9 MEQ/L (ref 3.4–4.9)
PROTEIN UA: 30 MG/DL
RBC # BLD: 3.48 M/UL (ref 4.7–6.1)
RBC UA: ABNORMAL /HPF (ref 0–5)
SODIUM BLD-SCNC: 137 MEQ/L (ref 135–144)
SPECIFIC GRAVITY UA: 1.02 (ref 1–1.03)
URINE REFLEX TO CULTURE: YES
UROBILINOGEN, URINE: 1 E.U./DL
WBC # BLD: 7.8 K/UL (ref 4.8–10.8)
WBC UA: ABNORMAL /HPF (ref 0–5)

## 2019-07-04 PROCEDURE — 2580000003 HC RX 258: Performed by: PHYSICIAN ASSISTANT

## 2019-07-04 PROCEDURE — 6370000000 HC RX 637 (ALT 250 FOR IP): Performed by: INTERNAL MEDICINE

## 2019-07-04 PROCEDURE — 73700 CT LOWER EXTREMITY W/O DYE: CPT

## 2019-07-04 PROCEDURE — 36415 COLL VENOUS BLD VENIPUNCTURE: CPT

## 2019-07-04 PROCEDURE — 6370000000 HC RX 637 (ALT 250 FOR IP): Performed by: SPECIALIST

## 2019-07-04 PROCEDURE — 81001 URINALYSIS AUTO W/SCOPE: CPT

## 2019-07-04 PROCEDURE — 96365 THER/PROPH/DIAG IV INF INIT: CPT

## 2019-07-04 PROCEDURE — 96372 THER/PROPH/DIAG INJ SC/IM: CPT

## 2019-07-04 PROCEDURE — 87086 URINE CULTURE/COLONY COUNT: CPT

## 2019-07-04 PROCEDURE — 80048 BASIC METABOLIC PNL TOTAL CA: CPT

## 2019-07-04 PROCEDURE — 6360000002 HC RX W HCPCS: Performed by: PHYSICIAN ASSISTANT

## 2019-07-04 PROCEDURE — G0378 HOSPITAL OBSERVATION PER HR: HCPCS

## 2019-07-04 PROCEDURE — 2580000003 HC RX 258: Performed by: INTERNAL MEDICINE

## 2019-07-04 PROCEDURE — 6360000002 HC RX W HCPCS: Performed by: INTERNAL MEDICINE

## 2019-07-04 PROCEDURE — 85025 COMPLETE CBC W/AUTO DIFF WBC: CPT

## 2019-07-04 RX ADMIN — SODIUM CHLORIDE: 9 INJECTION, SOLUTION INTRAVENOUS at 05:24

## 2019-07-04 RX ADMIN — ASPIRIN 81 MG 81 MG: 81 TABLET ORAL at 09:48

## 2019-07-04 RX ADMIN — RISPERIDONE 0.5 MG: 0.5 TABLET ORAL at 09:48

## 2019-07-04 RX ADMIN — MEMANTINE HYDROCHLORIDE 5 MG: 5 TABLET ORAL at 23:08

## 2019-07-04 RX ADMIN — Medication 400 MG: at 09:48

## 2019-07-04 RX ADMIN — PAROXETINE 10 MG: 10 TABLET, FILM COATED ORAL at 23:09

## 2019-07-04 RX ADMIN — FINASTERIDE 5 MG: 5 TABLET, FILM COATED ORAL at 09:48

## 2019-07-04 RX ADMIN — RISPERIDONE 0.5 MG: 0.5 TABLET ORAL at 13:34

## 2019-07-04 RX ADMIN — METOPROLOL TARTRATE 12.5 MG: 25 TABLET ORAL at 09:48

## 2019-07-04 RX ADMIN — METOPROLOL TARTRATE 12.5 MG: 25 TABLET ORAL at 23:25

## 2019-07-04 RX ADMIN — ENOXAPARIN SODIUM 40 MG: 40 INJECTION SUBCUTANEOUS at 09:48

## 2019-07-04 RX ADMIN — CLOPIDOGREL BISULFATE 75 MG: 75 TABLET ORAL at 09:48

## 2019-07-04 RX ADMIN — CEFTRIAXONE SODIUM 1 G: 1 INJECTION, POWDER, FOR SOLUTION INTRAMUSCULAR; INTRAVENOUS at 13:34

## 2019-07-04 NOTE — FLOWSHEET NOTE
Pt went for Duplex ultrasound earlier in the shift. Cot was placed in room for wife to stay. Patient is not as lethargic able to open eyes and talk to this nurse and wife.   Will continue to monitor

## 2019-07-04 NOTE — PROGRESS NOTES
fracture. Patient grimaces in pain on moving right leg. Will get CT of right hip. US right lower extremity pending  - Atrial fibrillation: off anticoagulation due to recurrent falls.  On aspirin and Plavix    DVT prophylaxis: Lovenox  Cassie Montejo MD  Pager : [phone]

## 2019-07-05 PROBLEM — R53.1 GENERALIZED WEAKNESS: Status: ACTIVE | Noted: 2019-07-05

## 2019-07-05 LAB
ANION GAP SERPL CALCULATED.3IONS-SCNC: 11 MEQ/L (ref 9–15)
BASOPHILS ABSOLUTE: 0 K/UL (ref 0–0.2)
BASOPHILS RELATIVE PERCENT: 0.4 %
BUN BLDV-MCNC: 17 MG/DL (ref 8–23)
CALCIUM SERPL-MCNC: 8.1 MG/DL (ref 8.5–9.9)
CHLORIDE BLD-SCNC: 102 MEQ/L (ref 95–107)
CO2: 23 MEQ/L (ref 20–31)
CREAT SERPL-MCNC: 0.83 MG/DL (ref 0.7–1.2)
EOSINOPHILS ABSOLUTE: 0.6 K/UL (ref 0–0.7)
EOSINOPHILS RELATIVE PERCENT: 7.4 %
GFR AFRICAN AMERICAN: >60
GFR NON-AFRICAN AMERICAN: >60
GLUCOSE BLD-MCNC: 95 MG/DL (ref 70–99)
HCT VFR BLD CALC: 32.9 % (ref 42–52)
HEMOGLOBIN: 11.6 G/DL (ref 14–18)
LYMPHOCYTES ABSOLUTE: 1.6 K/UL (ref 1–4.8)
LYMPHOCYTES RELATIVE PERCENT: 17.9 %
MCH RBC QN AUTO: 33.8 PG (ref 27–31.3)
MCHC RBC AUTO-ENTMCNC: 35.1 % (ref 33–37)
MCV RBC AUTO: 96.4 FL (ref 80–100)
MONOCYTES ABSOLUTE: 0.7 K/UL (ref 0.2–0.8)
MONOCYTES RELATIVE PERCENT: 8 %
NEUTROPHILS ABSOLUTE: 5.8 K/UL (ref 1.4–6.5)
NEUTROPHILS RELATIVE PERCENT: 66.3 %
PDW BLD-RTO: 13.4 % (ref 11.5–14.5)
PLATELET # BLD: 263 K/UL (ref 130–400)
POTASSIUM REFLEX MAGNESIUM: 3.8 MEQ/L (ref 3.4–4.9)
RBC # BLD: 3.41 M/UL (ref 4.7–6.1)
SODIUM BLD-SCNC: 136 MEQ/L (ref 135–144)
URINE CULTURE, ROUTINE: NORMAL
WBC # BLD: 8.8 K/UL (ref 4.8–10.8)

## 2019-07-05 PROCEDURE — 2580000003 HC RX 258: Performed by: INTERNAL MEDICINE

## 2019-07-05 PROCEDURE — 6370000000 HC RX 637 (ALT 250 FOR IP): Performed by: INTERNAL MEDICINE

## 2019-07-05 PROCEDURE — 6360000002 HC RX W HCPCS: Performed by: INTERNAL MEDICINE

## 2019-07-05 PROCEDURE — 80048 BASIC METABOLIC PNL TOTAL CA: CPT

## 2019-07-05 PROCEDURE — 96376 TX/PRO/DX INJ SAME DRUG ADON: CPT

## 2019-07-05 PROCEDURE — 97110 THERAPEUTIC EXERCISES: CPT

## 2019-07-05 PROCEDURE — 6370000000 HC RX 637 (ALT 250 FOR IP): Performed by: SPECIALIST

## 2019-07-05 PROCEDURE — 36415 COLL VENOUS BLD VENIPUNCTURE: CPT

## 2019-07-05 PROCEDURE — G0378 HOSPITAL OBSERVATION PER HR: HCPCS

## 2019-07-05 PROCEDURE — 96375 TX/PRO/DX INJ NEW DRUG ADDON: CPT

## 2019-07-05 PROCEDURE — 2580000003 HC RX 258: Performed by: PHYSICIAN ASSISTANT

## 2019-07-05 PROCEDURE — 85025 COMPLETE CBC W/AUTO DIFF WBC: CPT

## 2019-07-05 PROCEDURE — 96372 THER/PROPH/DIAG INJ SC/IM: CPT

## 2019-07-05 PROCEDURE — 6360000002 HC RX W HCPCS: Performed by: PHYSICIAN ASSISTANT

## 2019-07-05 PROCEDURE — 97535 SELF CARE MNGMENT TRAINING: CPT

## 2019-07-05 RX ORDER — ACETAMINOPHEN 325 MG/1
650 TABLET ORAL EVERY 4 HOURS PRN
Status: DISCONTINUED | OUTPATIENT
Start: 2019-07-05 | End: 2019-07-06 | Stop reason: HOSPADM

## 2019-07-05 RX ADMIN — Medication 400 MG: at 09:37

## 2019-07-05 RX ADMIN — PAROXETINE 10 MG: 10 TABLET, FILM COATED ORAL at 20:26

## 2019-07-05 RX ADMIN — CEFTRIAXONE SODIUM 1 G: 1 INJECTION, POWDER, FOR SOLUTION INTRAMUSCULAR; INTRAVENOUS at 13:10

## 2019-07-05 RX ADMIN — ENOXAPARIN SODIUM 40 MG: 40 INJECTION SUBCUTANEOUS at 09:36

## 2019-07-05 RX ADMIN — ACETAMINOPHEN 650 MG: 325 TABLET ORAL at 20:27

## 2019-07-05 RX ADMIN — ASPIRIN 81 MG 81 MG: 81 TABLET ORAL at 09:37

## 2019-07-05 RX ADMIN — METOPROLOL TARTRATE 12.5 MG: 25 TABLET ORAL at 20:26

## 2019-07-05 RX ADMIN — SODIUM CHLORIDE: 9 INJECTION, SOLUTION INTRAVENOUS at 05:51

## 2019-07-05 RX ADMIN — MEMANTINE HYDROCHLORIDE 5 MG: 5 TABLET ORAL at 20:26

## 2019-07-05 RX ADMIN — CLOPIDOGREL BISULFATE 75 MG: 75 TABLET ORAL at 09:37

## 2019-07-05 RX ADMIN — FINASTERIDE 5 MG: 5 TABLET, FILM COATED ORAL at 09:36

## 2019-07-05 RX ADMIN — SODIUM CHLORIDE: 9 INJECTION, SOLUTION INTRAVENOUS at 20:24

## 2019-07-05 RX ADMIN — METOPROLOL TARTRATE 12.5 MG: 25 TABLET ORAL at 09:37

## 2019-07-05 NOTE — PROGRESS NOTES
Diverticular change, sigmoid colon. No right hip fracture. Probable fecal impaction, with possible stercoral colitis. Sigmoid diverticulosis. Fat-containing bilateral inguinal hernias. All CT scans at this facility use dose modulation, iterative reconstruction, and/or weight based dosing when appropriate to reduce radiation dose to as low as reasonably achievable. Us Carotid Artery Bilateral    Result Date: 6/23/2019  US CAROTID ARTERY BILATERAL: 6/22/2019 CLINICAL HISTORY:  left sided headache with syncope . COMPARISON: Neck CT with contrast 9/28/2014. Grayscale, color and waveform Doppler analysis of the cervical carotid and vertebral arteries was performed. Validated velocity measurements with angiographic measurements, velocity criteria are extrapolated from diameter data as defined by the Society of Radiologist in [address] [place] [address] Drive Radiology 2003; 582;[id number]. FINDINGS: There is mild to moderate heterogeneous atherosclerotic plaquing of the carotid bulbs without significantly elevated velocities, spectral waveform broadening, or incidental findings of concern identified. Maximum systolic velocity within the right internal and mid common carotid arteries are 47 and 63 cm/s, with an ICA/CCA ratio of approximately 0.74 , which indicates less than 50% by velocity criteria. Maximum systolic velocity within the left internal and mid common carotid arteries are 35 and 64 cm/s, with an ICA/CCA ratio of approximately 0.55, which indicates less than 50% by velocity criteria. Maximum velocities within the right and left external carotid arteries are 57 and 46 cm/sec respectively. There is antegrade flow in both vertebral arteries. MILD TO MODERATE HETEROGENEOUS ATHEROSCLEROTIC PLAQUING OF THE CAROTID BULBS WITHOUT EVIDENCE OF A FLOW-LIMITING STENOSIS.          Objective:   BP (!) 149/86   Pulse 85   Temp 97.7 °F (36.5 °C)   Resp 16   Ht 6' (1.829 m)   Wt 192 lb (87.1 kg)   SpO2 100%   BMI 26.04 kg/m²       Intake/Output Summary (Last 24 hours) at 7/5/2019 1745  Last data filed at 7/5/2019 7622  Gross per 24 hour   Intake 2211 ml   Output --   Net 2211 ml            Physical Exam:  MUSCULOSKELETAL:  There is no redness, warmth, or swelling of the joints. Tone is normal.    Patient has had full range of motion in all the limbs. NEUROLOGIC:    Speech Expression: appropriate quality, quantity and organization of sentences  Speech Comprehension: intact    Mental Status Exam: alert, oriented to person, place, and time, anxious    Motor Strength:   right upper extremity:   5/5  left upper extremity:  5/5  right lower extremity: 5/5    left lower extremity:  5/5      Medications:     sodium chloride flush  10 mL Intravenous 2 times per day    enoxaparin  40 mg Subcutaneous Daily    aspirin  81 mg Oral Daily    finasteride  5 mg Oral Daily    lamoTRIgine  25 mg Oral Nightly    magnesium oxide  400 mg Oral Daily    metoprolol tartrate  12.5 mg Oral BID    PARoxetine  10 mg Oral Nightly    risperiDONE  0.5 mg Oral TID    pill splitter   Does not apply Once    clopidogrel  75 mg Oral Daily    memantine  5 mg Oral Nightly                 Principal Problem:    Generalized weakness  Active Problems:    Alzheimer's dementia without behavioral disturbance  Resolved Problems:    * No resolved hospital problems.  *      Patient Active Problem List   Diagnosis    Osteoarthritis    Gout, chronic    Atrial fibrillation (HCC)    Hyperlipidemia    Pacemaker    Sinoatrial node dysfunction (HCC)    Cervicalgia    Enlarged prostate    Essential hypertension, benign    Subclinical hypothyroidism    Elevated TSH    Alzheimer's dementia without behavioral disturbance    Normocytic anemia    S/P total knee arthroplasty    Bleeding of penis    Elevated PSA    Benign prostatic hyperplasia    Osteoarthritis of finger of right hand    Cervical strain    Abnormal serum immunoelectrophoresis   

## 2019-07-05 NOTE — PROGRESS NOTES
Patient has been very confused all day, and has been incontinent of stool and urine on multiple occasions. Patient only alert to name. Currently sleeping. Will continue to monitor.

## 2019-07-05 NOTE — PROGRESS NOTES
Hospitalist Progress Note      PCP: Guilherme Apodaca MD    Date of Admission: 7/3/2019    Subjective: :  Patient has dementia. Denies any complaints  Family at bedside. Medications:  Reviewed    Infusion Medications    sodium chloride 100 mL/hr at 07/05/19 0553     Scheduled Medications    sodium chloride flush  10 mL Intravenous 2 times per day    enoxaparin  40 mg Subcutaneous Daily    aspirin  81 mg Oral Daily    finasteride  5 mg Oral Daily    lamoTRIgine  25 mg Oral Nightly    magnesium oxide  400 mg Oral Daily    metoprolol tartrate  12.5 mg Oral BID    PARoxetine  10 mg Oral Nightly    risperiDONE  0.5 mg Oral TID    pill splitter   Does not apply Once    clopidogrel  75 mg Oral Daily    memantine  5 mg Oral Nightly     PRN Meds: sodium chloride flush, magnesium hydroxide, ondansetron      Intake/Output Summary (Last 24 hours) at 7/5/2019 1359  Last data filed at 7/5/2019 0905  Gross per 24 hour   Intake 2211 ml   Output --   Net 2211 ml       Exam:    BP (!) 149/86   Pulse 85   Temp 97.7 °F (36.5 °C)   Resp 16   Ht 6' (1.829 m)   Wt 192 lb (87.1 kg)   SpO2 100%   BMI 26.04 kg/m²     General appearance: No apparent distress  HEENT:  Conjunctivae/corneas clear. Neck: Supple, with full range of motion. Respiratory:  Normal respiratory effort. Clear to auscultation, bilaterally without Rales/Wheezes/Rhonchi. Cardiovascular: Regular rate and rhythm with normal S1/S2 without murmurs, rubs or gallops. Abdomen: Soft, non-tender, non-distended with normal bowel sounds. Musculoskeletal: No clubbing, cyanosis or edema bilaterally. No pain on moving both LE. Neuro: alert, oriented to self. Moving all extremities.        Labs:   Recent Labs     07/03/19  0858 07/04/19  0547 07/05/19  0547   WBC 10.9* 7.8 8.8   HGB 12.9* 11.6* 11.6*   HCT 37.1* 33.4* 32.9*    228 263     Recent Labs     07/03/19  0858 07/04/19  0547 07/05/19  0547    137 136   K 4.0 3.9 3.8   CL 99 103 102   CO2

## 2019-07-05 NOTE — DISCHARGE INSTR - COC
Feedings:[id number]}  Liquids: {Slp liquid thickness:77890}  Daily Fluid Restriction: {CHP DME Yes amt example:624189726}  Last Modified Barium Swallow with Video (Video Swallowing Test): {Done Not Done IPSK:833452576}    Treatments at the Time of Hospital Discharge:   Respiratory Treatments: ***  Oxygen Therapy:  {Therapy; copd oxygen:61202}  Ventilator:    {[facility] Vent SPTS:171898943}    Rehab Therapies: {THERAPEUTIC INTERVENTION:9515877345}  Weight Bearing Status/Restrictions: 50Kiran Underwood  Weight Bearin}  Other Medical Equipment (for information only, NOT a DME order):  {EQUIPMENT:934922567}  Other Treatments: ***    Patient's personal belongings (please select all that are sent with patient):  {CHP DME Belongings:406724527}    RN SIGNATURE:  {Esignature:864223278}    CASE MANAGEMENT/SOCIAL WORK SECTION    Inpatient Status Date: ***    Readmission Risk Assessment Score:  Readmission Risk              Risk of Unplanned Readmission:        19           Discharging to Facility/ Agency   · Name: [address] [place], [place] [address]ab  · Address:  · Phone:  · Fax:    Dialysis Facility (if applicable)   · Name:  · Address:  · Dialysis Schedule:  · Phone:  · Fax:    / signature: Electronically signed by KIAN Blackburn on 19 at 12:40 PM    PHYSICIAN SECTION    Prognosis: {Prognosis:5792448308}    Condition at Discharge: 50Kiran Underwood Patient Condition:938599117}    Rehab Potential (if transferring to Rehab): {Prognosis:7189873428}    Recommended Labs or Other Treatments After Discharge: ***    Physician Certification: I certify the above information and transfer of Jake Arenas  is necessary for the continuing treatment of the diagnosis listed and that he requires {Admit to Appropriate Level of Care:66157} for {GREATER/LESS:835290322} 30 days.      Update Admission H&P: {CHP DME Changes in ETYJX:891419336}    PHYSICIAN SIGNATURE:  {Esignature:714408660}

## 2019-07-05 NOTE — PROGRESS NOTES
with spurring most significant C5-6 and C6-7. Multilevel facet arthropathy and uncovertebral hypertrophy. Lateral masses of C1 articulate symmetrically with C2. Osseous neural foraminal stenosis on the right at C4-5 and C5-6. Prevertebral soft tissues have a normal appearance. Multilevel degenerative changes of the cervical spine without acute fracture or malalignment identified by radiography. Xr Femur Right (min 2 Views)    Result Date: 7/3/2019  EXAM: X-ray right femur, 2 view  HISTORY: Pain for 2 days TECHNIQUE: Frontal and lateral views of the right femur COMPARISON: None available FINDINGS: No acute or aggressive osseous abnormality of the right femur. No hip dislocation. Postsurgical changes of total knee arthroplasty without overt periprosthetic lucency or fracture. Soft tissues appear within normal limits. Vascular calcifications are noted. No acute osseous abnormality. Portable chest radiograph History: Confusion Technique: Supine AP portable view of the chest obtained. Comparison: Portable chest radiograph from June 21, 2019 Findings: Left-sided pacemaker is present. Atherosclerotic calcification of the thoracic aorta. Heart size is at the upper limits of normal. No pneumothorax, pleural effusion, or focal consolidation. Osseous structures of the thorax appear intact. IMPRESSION: No acute intrathoracic process. Ct Head Wo Contrast    Result Date: 7/3/2019  CT SCAN OF THE BRAIN WITHOUT CONTRAST CLINICAL HISTORY:  Change in mental status, confusion, slurred speech. COMPARISONS:  6/21/2019 TECHNIQUE:  Unenhanced brain CT . There are motion artifacts present, which degrades the exam. FINDINGS: The ventricles are normal in position. No focal abnormalities are seen within the brain parenchyma. There is no evidence of mass effect. There is moderate dilatation of the cerebral sulci and ventricles. Minimal nonspecific changes periventricular deep white matter, unchanged.  There is normal grey- white matter differentiation. There is no evidence of hemorrhage. There is thinning of corpus callosum. Soft tissue density fills the right maxillary sinus. Other paranasal sinuses are clear. Mastoid air cells visualized are clear. Views of the skull are unremarkable. The orbits are unremarkable. Right frontal scalp soft tissue swelling. The patient's finger overlies the left frontal bone and causes artifacts within the adjacent left frontal lobe. SUSPECT ARTIFACT TO THE LEFT FRONTAL LOBE. OTHERWISE, AGE-RELATED FINDINGS, NO ACUTE ABNORMALITY. ATROPHY AND CHRONIC SMALL VESSEL ISCHEMIC CHANGES DEEP WHITE MATTER. MOTION ARTIFACTS. SOFT TISSUES SWELLING RIGHT FRONTAL SCALP. All CT scans at this facility use dose modulation, iterative reconstruction, and/or weight based dosing when appropriate to reduce radiation dose to as low as reasonably achievable. Ct Head Wo Contrast    Result Date: 6/21/2019  EXAMINATION: CT HEAD WO CONTRAST CLINICAL HISTORY: fall 2 days ago COMPARISON:  MAY 25, 2018 An unenhanced scan is performed. FINDINGS:   There is no bleed, mass effect, or space occupying lesion. No extra-axial mass or fluid collections. Chronic inflammatory changes in the maxillary sinus cavities, bilaterally, remaining stable and unchanged 5/25/2018. Skull base intact. NO ACUTE PROCESS IN THE BRAIN. All CT scans at this facility use dose modulation, iterative reconstruction, and/or weight based dosing when appropriate to reduce radiation dose to as low as reasonably achievable. Xr Chest Portable    Result Date: 7/3/2019  EXAM: X-ray right femur, 2 view  HISTORY: Pain for 2 days TECHNIQUE: Frontal and lateral views of the right femur COMPARISON: None available FINDINGS: No acute or aggressive osseous abnormality of the right femur. No hip dislocation. Postsurgical changes of total knee arthroplasty without overt periprosthetic lucency or fracture. Soft tissues appear within normal limits.  Vascular calcifications are noted. No acute osseous abnormality. Portable chest radiograph History: Confusion Technique: Supine AP portable view of the chest obtained. Comparison: Portable chest radiograph from June 21, 2019 Findings: Left-sided pacemaker is present. Atherosclerotic calcification of the thoracic aorta. Heart size is at the upper limits of normal. No pneumothorax, pleural effusion, or focal consolidation. Osseous structures of the thorax appear intact. IMPRESSION: No acute intrathoracic process. Xr Chest Portable    Result Date: 6/21/2019  Portable chest radiograph History: Syncope Technique: AP portable view of the chest obtained. Comparison: Chest radiograph from April 29, 2019 Findings: Left-sided pacemaker is present. The cardiomediastinal silhouette is within normal limits. No pneumothorax, pleural effusion, or focal consolidation. Osseous structures of the thorax appear intact. No acute intrathoracic process. Ct Hip Right Wo Contrast    Result Date: 7/4/2019  CT right hip without intravenous contrast medium. HISTORY: Fall. Pain. Radiograph negative. Technical factors: CT imaging right hip obtained and formatted as 2.5 mm contiguous axial images. Sagittal coronal reconstruction obtained during postprocessing. No intravenous contrast medium utilized. No prior CT imaging right hip available for comparison. Comparison made with plain film imaging, right hip, July 3, 2019. FINDINGS: No fracture identified. No osteoblastic and no osteolytic lesions. Mild narrowing right hip joint. Marked fat identified bilaterally within inguinal canals. Copious stool filling rectum with rectal wall thickening measuring up to 1 cm. Mild pericolonic fat stranding, posterior wall rectum. Punctate calcification identified within prostate. Diverticular change, sigmoid colon. No right hip fracture. Probable fecal impaction, with possible stercoral colitis. Sigmoid diverticulosis.  Fat-containing bilateral ml            Physical Exam:  MUSCULOSKELETAL:  There is no redness, warmth, or swelling of the joints. Tone is normal.    Patient has had full range of motion in all the limbs. NEUROLOGIC:    Speech Expression: appropriate quality, quantity and organization of sentences  Speech Comprehension: intact    Mental Status Exam: alert, oriented to person, place, and time, anxious    Motor Strength:   right upper extremity:   5/5  left upper extremity:  5/5  right lower extremity: 5/5    left lower extremity:  5/5      Medications:     cefTRIAXone (ROCEPHIN) IV  1 g Intravenous Q24H    sodium chloride flush  10 mL Intravenous 2 times per day    enoxaparin  40 mg Subcutaneous Daily    aspirin  81 mg Oral Daily    finasteride  5 mg Oral Daily    lamoTRIgine  25 mg Oral Nightly    magnesium oxide  400 mg Oral Daily    metoprolol tartrate  12.5 mg Oral BID    PARoxetine  10 mg Oral Nightly    risperiDONE  0.5 mg Oral TID    pill splitter   Does not apply Once    clopidogrel  75 mg Oral Daily    memantine  5 mg Oral Nightly                 Active Problems:    TIA (transient ischemic attack)  Resolved Problems:    * No resolved hospital problems.  *      Patient Active Problem List   Diagnosis    Osteoarthritis    Gout, chronic    Atrial fibrillation (HCC)    Hyperlipidemia    Pacemaker    Sinoatrial node dysfunction (HCC)    Cervicalgia    Enlarged prostate    Essential hypertension, benign    Subclinical hypothyroidism    Elevated TSH    Alzheimer's dementia    Normocytic anemia    S/P total knee arthroplasty    Bleeding of penis    Elevated PSA    Benign prostatic hyperplasia    Osteoarthritis of finger of right hand    Cervical strain    Abnormal serum immunoelectrophoresis    DJD (degenerative joint disease) of cervical spine    Atypical syncope    TIA (transient ischemic attack)       Assessment/Plan  Patient had a near syncopal episode with a generalized weakness, diaphoresis,

## 2019-07-05 NOTE — CARE COORDINATION
TAYLORW spoke with pt's wife and daughter regarding the DC plan. Rehab cannot take pt and he does not have the 3 overnight stay as an inpatient to qualify him for SNF. Wife will take pt home with [facility] if he cannot qualify for rehab or SNF. Pt has a hospital bed at home and she has [facility] 2 hours per day to help with needs at home.

## 2019-07-06 VITALS
TEMPERATURE: 97.7 F | RESPIRATION RATE: 16 BRPM | WEIGHT: 190.4 LBS | HEIGHT: 72 IN | SYSTOLIC BLOOD PRESSURE: 156 MMHG | BODY MASS INDEX: 25.79 KG/M2 | DIASTOLIC BLOOD PRESSURE: 101 MMHG | HEART RATE: 81 BPM | OXYGEN SATURATION: 100 %

## 2019-07-06 LAB
ANION GAP SERPL CALCULATED.3IONS-SCNC: 13 MEQ/L (ref 9–15)
BASOPHILS ABSOLUTE: 0 K/UL (ref 0–0.2)
BASOPHILS RELATIVE PERCENT: 0.4 %
BUN BLDV-MCNC: 11 MG/DL (ref 8–23)
CALCIUM SERPL-MCNC: 8.2 MG/DL (ref 8.5–9.9)
CHLORIDE BLD-SCNC: 105 MEQ/L (ref 95–107)
CO2: 20 MEQ/L (ref 20–31)
CREAT SERPL-MCNC: 0.66 MG/DL (ref 0.7–1.2)
EOSINOPHILS ABSOLUTE: 0.5 K/UL (ref 0–0.7)
EOSINOPHILS RELATIVE PERCENT: 7 %
GFR AFRICAN AMERICAN: >60
GFR NON-AFRICAN AMERICAN: >60
GLUCOSE BLD-MCNC: 97 MG/DL (ref 70–99)
HCT VFR BLD CALC: 34.5 % (ref 42–52)
HEMOGLOBIN: 12 G/DL (ref 14–18)
LYMPHOCYTES ABSOLUTE: 1.3 K/UL (ref 1–4.8)
LYMPHOCYTES RELATIVE PERCENT: 17.8 %
MAGNESIUM: 2 MG/DL (ref 1.7–2.4)
MCH RBC QN AUTO: 33.2 PG (ref 27–31.3)
MCHC RBC AUTO-ENTMCNC: 34.7 % (ref 33–37)
MCV RBC AUTO: 95.9 FL (ref 80–100)
MONOCYTES ABSOLUTE: 0.6 K/UL (ref 0.2–0.8)
MONOCYTES RELATIVE PERCENT: 8.3 %
NEUTROPHILS ABSOLUTE: 4.8 K/UL (ref 1.4–6.5)
NEUTROPHILS RELATIVE PERCENT: 66.5 %
PDW BLD-RTO: 13.1 % (ref 11.5–14.5)
PLATELET # BLD: 287 K/UL (ref 130–400)
POTASSIUM REFLEX MAGNESIUM: 3.8 MEQ/L (ref 3.4–4.9)
RBC # BLD: 3.6 M/UL (ref 4.7–6.1)
SODIUM BLD-SCNC: 138 MEQ/L (ref 135–144)
WBC # BLD: 7.2 K/UL (ref 4.8–10.8)

## 2019-07-06 PROCEDURE — 6360000002 HC RX W HCPCS: Performed by: PHYSICIAN ASSISTANT

## 2019-07-06 PROCEDURE — 6370000000 HC RX 637 (ALT 250 FOR IP): Performed by: INTERNAL MEDICINE

## 2019-07-06 PROCEDURE — 97535 SELF CARE MNGMENT TRAINING: CPT

## 2019-07-06 PROCEDURE — 6370000000 HC RX 637 (ALT 250 FOR IP): Performed by: SPECIALIST

## 2019-07-06 PROCEDURE — 85025 COMPLETE CBC W/AUTO DIFF WBC: CPT

## 2019-07-06 PROCEDURE — 80048 BASIC METABOLIC PNL TOTAL CA: CPT

## 2019-07-06 PROCEDURE — 2580000003 HC RX 258: Performed by: PHYSICIAN ASSISTANT

## 2019-07-06 PROCEDURE — G0378 HOSPITAL OBSERVATION PER HR: HCPCS

## 2019-07-06 PROCEDURE — 83735 ASSAY OF MAGNESIUM: CPT

## 2019-07-06 PROCEDURE — 36415 COLL VENOUS BLD VENIPUNCTURE: CPT

## 2019-07-06 PROCEDURE — 96372 THER/PROPH/DIAG INJ SC/IM: CPT

## 2019-07-06 PROCEDURE — 96376 TX/PRO/DX INJ SAME DRUG ADON: CPT

## 2019-07-06 RX ORDER — CLOPIDOGREL BISULFATE 75 MG/1
75 TABLET ORAL DAILY
Qty: 30 TABLET | Refills: 3 | Status: SHIPPED | OUTPATIENT
Start: 2019-07-07

## 2019-07-06 RX ORDER — POTASSIUM CHLORIDE 1.5 G/1.77G
40 POWDER, FOR SOLUTION ORAL ONCE
Status: COMPLETED | OUTPATIENT
Start: 2019-07-06 | End: 2019-07-06

## 2019-07-06 RX ORDER — MEMANTINE HYDROCHLORIDE 5 MG/1
5 TABLET ORAL NIGHTLY
Qty: 30 TABLET | Refills: 3 | Status: SHIPPED | OUTPATIENT
Start: 2019-07-06

## 2019-07-06 RX ADMIN — FINASTERIDE 5 MG: 5 TABLET, FILM COATED ORAL at 11:16

## 2019-07-06 RX ADMIN — METOPROLOL TARTRATE 12.5 MG: 25 TABLET ORAL at 11:13

## 2019-07-06 RX ADMIN — ENOXAPARIN SODIUM 40 MG: 40 INJECTION SUBCUTANEOUS at 11:13

## 2019-07-06 RX ADMIN — Medication 400 MG: at 11:13

## 2019-07-06 RX ADMIN — ASPIRIN 81 MG 81 MG: 81 TABLET ORAL at 11:13

## 2019-07-06 RX ADMIN — Medication 10 ML: at 11:13

## 2019-07-06 RX ADMIN — SODIUM CHLORIDE: 9 INJECTION, SOLUTION INTRAVENOUS at 05:22

## 2019-07-06 RX ADMIN — CLOPIDOGREL BISULFATE 75 MG: 75 TABLET ORAL at 11:13

## 2019-07-06 RX ADMIN — POTASSIUM CHLORIDE 40 MEQ: 1.5 POWDER, FOR SOLUTION ORAL at 17:25

## 2019-07-06 ASSESSMENT — PAIN SCALES - GENERAL: PAINLEVEL_OUTOF10: 0

## 2019-07-06 NOTE — PROGRESS NOTES
pain after syncope COMPARISONS: None available. TECHNIQUE: AP, lateral, bilateral oblique, and odontoid views of the cervical spine performed FINDINGS: Straightening of the cervical lordosis. Atlantodental interval is preserved. Multilevel degenerative endplate changes with spurring most significant C5-6 and C6-7. Multilevel facet arthropathy and uncovertebral hypertrophy. Lateral masses of C1 articulate symmetrically with C2. Osseous neural foraminal stenosis on the right at C4-5 and C5-6. Prevertebral soft tissues have a normal appearance. Multilevel degenerative changes of the cervical spine without acute fracture or malalignment identified by radiography. Xr Femur Right (min 2 Views)    Result Date: 7/3/2019  EXAM: X-ray right femur, 2 view  HISTORY: Pain for 2 days TECHNIQUE: Frontal and lateral views of the right femur COMPARISON: None available FINDINGS: No acute or aggressive osseous abnormality of the right femur. No hip dislocation. Postsurgical changes of total knee arthroplasty without overt periprosthetic lucency or fracture. Soft tissues appear within normal limits. Vascular calcifications are noted. No acute osseous abnormality. Portable chest radiograph History: Confusion Technique: Supine AP portable view of the chest obtained. Comparison: Portable chest radiograph from June 21, 2019 Findings: Left-sided pacemaker is present. Atherosclerotic calcification of the thoracic aorta. Heart size is at the upper limits of normal. No pneumothorax, pleural effusion, or focal consolidation. Osseous structures of the thorax appear intact. IMPRESSION: No acute intrathoracic process. Ct Head Wo Contrast    Result Date: 7/3/2019  CT SCAN OF THE BRAIN WITHOUT CONTRAST CLINICAL HISTORY:  Change in mental status, confusion, slurred speech. COMPARISONS:  6/21/2019 TECHNIQUE:  Unenhanced brain CT .  There are motion artifacts present, which degrades the exam. FINDINGS: The ventricles are normal in position. No focal abnormalities are seen within the brain parenchyma. There is no evidence of mass effect. There is moderate dilatation of the cerebral sulci and ventricles. Minimal nonspecific changes periventricular deep white matter, unchanged. There is normal grey- white matter differentiation. There is no evidence of hemorrhage. There is thinning of corpus callosum. Soft tissue density fills the right maxillary sinus. Other paranasal sinuses are clear. Mastoid air cells visualized are clear. Views of the skull are unremarkable. The orbits are unremarkable. Right frontal scalp soft tissue swelling. The patient's finger overlies the left frontal bone and causes artifacts within the adjacent left frontal lobe. SUSPECT ARTIFACT TO THE LEFT FRONTAL LOBE. OTHERWISE, AGE-RELATED FINDINGS, NO ACUTE ABNORMALITY. ATROPHY AND CHRONIC SMALL VESSEL ISCHEMIC CHANGES DEEP WHITE MATTER. MOTION ARTIFACTS. SOFT TISSUES SWELLING RIGHT FRONTAL SCALP. All CT scans at this facility use dose modulation, iterative reconstruction, and/or weight based dosing when appropriate to reduce radiation dose to as low as reasonably achievable. Ct Head Wo Contrast    Result Date: 6/21/2019  EXAMINATION: CT HEAD WO CONTRAST CLINICAL HISTORY: fall 2 days ago COMPARISON:  MAY 25, 2018 An unenhanced scan is performed. FINDINGS:   There is no bleed, mass effect, or space occupying lesion. No extra-axial mass or fluid collections. Chronic inflammatory changes in the maxillary sinus cavities, bilaterally, remaining stable and unchanged 5/25/2018. Skull base intact. NO ACUTE PROCESS IN THE BRAIN. All CT scans at this facility use dose modulation, iterative reconstruction, and/or weight based dosing when appropriate to reduce radiation dose to as low as reasonably achievable.     Xr Chest Portable    Result Date: 7/3/2019  EXAM: X-ray right femur, 2 view  HISTORY: Pain for 2 days TECHNIQUE: Frontal and lateral views of the right femur hand    Cervical strain    Abnormal serum immunoelectrophoresis    DJD (degenerative joint disease) of cervical spine    Atypical syncope    TIA (transient ischemic attack)    Generalized weakness       Assessment/Plan  Patient had a near syncopal episode with a generalized weakness, diaphoresis, pallor, with a low blood pressure  His antihypertensives needs to be  Nausea no similar type since 2013  Atrial fibrillation is on Coumadin  C5-6 C6-7 disc disease with a history of neck pain  Permanent pacemaker  He h is adequately ronary anticoagulated has not hyperlipidemia  Vitamin D deficiency has been treated  History of fall on June 19, 2019  Mariana Gary since being [address] in the hospital he is doing better     Recommendations:     Patient doing better since being in the hospital  Tolerated low-dose Namenda continue low-dose Namenda.   He did not tolerate the full dose before  blood pressures running in good range  Antihypertensives need to be revised  Okay to discharge  In office 1 month              Darlene Gomes MD, 7/6/2019 6:32 PM

## 2019-07-06 NOTE — DISCHARGE SUMMARY
SMALL VESSEL ISCHEMIC CHANGES DEEP WHITE MATTER. MOTION ARTIFACTS. SOFT TISSUES SWELLING RIGHT FRONTAL SCALP. All CT scans at this facility use dose modulation, iterative reconstruction, and/or weight based dosing when appropriate to reduce radiation dose to as low as reasonably achievable. Xr Chest Portable    Result Date: 7/3/2019  EXAM: X-ray right femur, 2 view  HISTORY: Pain for 2 days TECHNIQUE: Frontal and lateral views of the right femur COMPARISON: None available FINDINGS: No acute or aggressive osseous abnormality of the right femur. No hip dislocation. Postsurgical changes of total knee arthroplasty without overt periprosthetic lucency or fracture. Soft tissues appear within normal limits. Vascular calcifications are noted. No acute osseous abnormality. Portable chest radiograph History: Confusion Technique: Supine AP portable view of the chest obtained. Comparison: Portable chest radiograph from June 21, 2019 Findings: Left-sided pacemaker is present. Atherosclerotic calcification of the thoracic aorta. Heart size is at the upper limits of normal. No pneumothorax, pleural effusion, or focal consolidation. Osseous structures of the thorax appear intact. IMPRESSION: No acute intrathoracic process. Ct Hip Right Wo Contrast    Result Date: 7/4/2019  CT right hip without intravenous contrast medium. HISTORY: Fall. Pain. Radiograph negative. Technical factors: CT imaging right hip obtained and formatted as 2.5 mm contiguous axial images. Sagittal coronal reconstruction obtained during postprocessing. No intravenous contrast medium utilized. No prior CT imaging right hip available for comparison. Comparison made with plain film imaging, right hip, July 3, 2019. FINDINGS: No fracture identified. No osteoblastic and no osteolytic lesions. Mild narrowing right hip joint. Marked fat identified bilaterally within inguinal canals.   Copious stool filling rectum with rectal wall thickening measuring up to

## 2019-07-06 NOTE — PROGRESS NOTES
Patient limited by cognitive status(pt easily agitated and confused. )       Discharge Recommendations:  Continue to assess pending progress, Patient would benefit from continued therapy after discharge    Goals:  Short term goals  Short term goal 1: pt to sit EOB with SBA > 5 min  Short term goal 2: pt to participate in >5 min dynamic activity  Long term goals  Long term goal 1: pt to be mod A for bed mobility  Long term goal 2: pt to be mod A +2 for pivot transfers  Long term goal 3: pt to maintain stand at LRAD >1 min   Patient Goals   Patient goals : spouse agreeable to PT POC    PLAN:   Plan  Times per week: 3-6  Current Treatment Recommendations: Strengthening, Functional Mobility Training, Neuromuscular Re-education, Home Exercise Program, Equipment Evaluation, Education, & procurement, Transfer Training, Gait Training, Safety Education & Training, Balance Training, Endurance Training, Patient/Caregiver Education & Training, Manual Therapy - Soft Tissue Mobilization, Manual Therapy - Joint Manipulation  Safety Devices  Type of devices:  All fall risk precautions in place, Bed alarm in place, Call light within reach, Left in bed     AMPAC (6 CLICK) BASIC MOBILITY  AM-PAC Inpatient Mobility Raw Score : 11      Therapy Time   Individual   Time In 1112   Time Out 1135   Minutes 23      BM/Trsf: [address] PTA, 07/06/19 at 11:41 AM

## 2019-07-06 NOTE — PROGRESS NOTES
Assessment complete. See flow sheet. Patient repositioned for comfort. Family at bedside. Call light in reach.

## 2019-07-06 NOTE — CONSULTS
was described to  the patient's wife and the procedure; however, this most likely could be  done on an outpatient basis. In the interim, we will leave  anticoagulation up to Neurology. We will also increase beta-blockade  given the fact this patient's blood pressure is adequate. I believe, we  most likely can stop the patient's hydration, increase potassium to some  degree, and also repeat echocardiogram just to look for any change in  this patient's heart function. In general, given this patient's  dementia, relatively conservative care could be made; however, we did  offer workup for the Watchman device as described above.         Iam Syed MD    D: 07/06/2019 12:24:46       T: 07/06/2019 15:36:16     [email or web address]  Job#: [id number]     Doc#: [id number]    CC:

## 2019-07-08 NOTE — PROGRESS NOTES
Physical Therapy  Facility/Department: [facility] MED SURG [id number]/[id number]  Physical Therapy Discharge      NAME: Franklin Reyes    : 1935 (81 y.o.)  MRN: [id number]    Account: [de-identified]  Gender: male      Patient has been discharged from acute care hospital. DC patient from current PT program.      Electronically signed by Yesenia Carmen PT on 19 at 4:35 PM

## 2019-07-16 ENCOUNTER — HOSPITAL ENCOUNTER (OUTPATIENT)
Dept: CARDIOLOGY | Age: 84
Discharge: HOME | End: 2019-07-16
Payer: MEDICARE

## 2019-07-16 PROCEDURE — 93296 REM INTERROG EVL PM/IDS: CPT

## 2019-07-17 ENCOUNTER — TELEPHONE (OUTPATIENT)
Dept: PHARMACY | Age: 84
End: 2019-07-17

## 2019-07-17 DIAGNOSIS — I48.91 ATRIAL FIBRILLATION, UNSPECIFIED TYPE (HCC): ICD-10-CM

## 2022-04-19 NOTE — PROGRESS NOTES
Quality 128: Preventive Care And Screening: Body Mass Index (Bmi) Screening And Follow-Up Plan: BMI is documented within normal parameters and no follow-up plan is required. Independent  Quality of Gait: VCs for increased step length and upright posture  Distance: 100'        Stairs  # Steps : 4  Stairs Height: 6\"  Rails: Right ascending  Device: No Device  Assistance: Stand by assistance, Supervision  Comment: Reciprocal up, non-reciprocal down. VCs for appropriate foot placement     HEP  Continue with current Home Exercise Program.  See Objective section for progression of HEP. Comments:       POST-PAIN    Pain Rating (0-10 pain scale): 0  Location and Pain Description same as pre-pain unless otherwise indicated. Action: [x] NA  [] Call Physician  [] Perform HEP  [] Meds as prescribed     ASSESSMENT:     Conditions Requiring Skilled Therapeutic Intervention  Assessment: pt requires frequent follow up for correct technique and carry over. Wife reports increased ease with mobility at home. Focus on increased strength, balance, and transfers this date. Tolerance to treatment:  [x] Good   [] Fair   [] Poor  [] Fatigued   [] Increased pain   Limited by:    Goals:        Long term goals  Time Frame for Long term goals : 4-5 weeks  Long term goal 1: Improve albin LE strength to >/= 4+/5 to improve stability with standing and walking. Long term goal 2: Pt will ambulate without an AD >/= 200' with improved speed and quality S/I. Long term goal 3: Teague >/= 45/56 and DGI >/= 18/24 to reduce risk for falls. Long term goal 4: 5xSTS </= 12sec without UEs from chair to improve safety and increase ease with transfers. Progress toward goals: LTG 2,4  Goals Met:    []  See updated POC   Comments:    PLAN:  [x] Continue POC to pt tolerance  [] Hold PT for ___ days.   See note to physician  [] Discharge PT    Signature:   Electronically signed by Marlyn Rebollar PT on 9/4/18 at 4:18 PM    PT Individual Minutes  Time In: 1400  Time Out: 8938  Minutes: 55  Timed Code Treatment Minutes: 55 Minutes Quality 137: Melanoma: Continuity Of Care - Recall System: Patient information entered into a recall system that includes: target date for the next exam specified AND a process to follow up with patients regarding missed or unscheduled appointments Quality 226: Preventive Care And Screening: Tobacco Use: Screening And Cessation Intervention: Patient screened for tobacco use and is an ex/non-smoker Detail Level: Detailed

## 2023-07-23 NOTE — PROGRESS NOTES
[facility] MED SURG TREATMENT NOTE     Date: 2019  Patient Name: Miryam Fenton        MRN: [id number]  Account: [de-identified]   : 1935  (81 y.o.)  Room: [place] [address]    Chart Review:  Diagnosis:  The primary encounter diagnosis was TIA (transient ischemic attack). Diagnoses of Slurring of speech and Weakness of both lower extremities were also pertinent to this visit. Restrictions:  fall  Activity Orders: as tolerated     Subjective:  Patient states: \"We can't move (sit) like that,\"  Pain: pt denied pain   Description:    Location:  n/a    Objective:    MARSHALL Cl ordered/donned:  []  Yes  [x]  No    If no - why    N/A    Pt confused. Pt becomes agitated when attempting to assist sitting EOB. Pt required Max verbal cues for hand placement and to recall task. Pt forgets and asks what to do multiple times. Pt returned to supine as therapist noticed soiled brief. Pt Max A to roll L and R with Max verbal cues for hand placement and to follow commands. Pt becoming agitated, Max cues to inform pt of all steps required to complete task prior and during activities. Pt wife assisted to redirect pt to calm.           Treatment consisted of:   [x] ADL Training  [] Strengthening   [] Transfer Training    [] DME Education  [] HEP   [] Manual Therapy   [] Patient Education  [] Other:    Assessment:   Performance deficits / Impairments: Decreased functional mobility , Decreased strength, Decreased endurance, Decreased ADL status, Decreased balance, Decreased cognition, Decreased ROM, Decreased fine motor control, Decreased coordination  REQUIRES OT FOLLOW UP: Yes  Discharge Recommendations: Continue to assess pending progress    6-Click  How much help for putting on and taking off regular lower body clothing?: Total  How much help for Bathing?: Total  How much help for Toileting?: Total  How much help for putting on and taking off regular upper body clothing?: Total  How much help for taking care unknown

## 2025-03-30 NOTE — H&P
Problem: Chronic Conditions and Co-morbidities  Goal: Patient's chronic conditions and co-morbidity symptoms are monitored and maintained or improved  Outcome: Progressing  Flowsheets (Taken 3/30/2025 0318)  Care Plan - Patient's Chronic Conditions and Co-Morbidity Symptoms are Monitored and Maintained or Improved: Monitor and assess patient's chronic conditions and comorbid symptoms for stability, deterioration, or improvement     Problem: Discharge Planning  Goal: Discharge to home or other facility with appropriate resources  Outcome: Progressing  Flowsheets (Taken 3/30/2025 1383)  Discharge to home or other facility with appropriate resources: Identify barriers to discharge with patient and caregiver     Problem: ABCDS Injury Assessment  Goal: Absence of physical injury  Outcome: Progressing      daily 6/23/19   Radha Lutz MD   lamoTRIgine (LAMICTAL) Take 25 mLs by mouth nightly 6/22/19 7/22/19  Radha Lutz MD   donepezil (ARICEPT) 5 MG tablet Take 5 mg by mouth daily     Historical Provider, MD   Probiotic Product (ALIGN PO) Take by mouth daily    Historical Provider, MD   PARoxetine (PAXIL) 10 MG tablet Take 10 mg by mouth nightly     Historical Provider, MD   risperiDONE (RISPERDAL) 0.5 MG tablet Take 0.5 mg by mouth 3 times daily     Historical Provider, MD   finasteride (PROSCAR) 5 MG tablet Take 1 tablet by mouth daily 2/2/17   Mars Goyal MD   magnesium oxide (MAG-OX) 400 MG tablet Take 400 mg by mouth daily    Historical Provider, MD   Cholecalciferol (VITAMIN D3) 2000 UNITS CAPS Take 1,000 mg by mouth daily. Historical Provider, MD   metoprolol (LOPRESSOR) 25 MG tablet Take 12.5 mg by mouth 2 times daily     Historical Provider, MD   Calcium Carbonate-Vitamin D (CALCIUM 500 + D PO) Take 1 tablet by mouth daily. Historical Provider, MD   Multiple Vitamin (TAB-A-YVETTE PO) Take 1 tablet by mouth daily. Historical Provider, MD       Allergies:  Adhesive tape and Rivastigmine    Social History:      The patient currently lives home    TOBACCO:   reports that he has never smoked. He has never used smokeless tobacco.  ETOH:   reports that he drank alcohol. Family History:       Positive as follows:        Problem Relation Age of Onset    Cancer Mother         ovarian    Sudden Death Father     Cancer Sister     Cancer Brother        REVIEW OF SYSTEMS:   Pertinent positives as noted in the HPI. All other systems reviewed and negative. PHYSICAL EXAM:    BP (!) 155/83   Pulse 90   Temp 97.5 °F (36.4 °C) (Tympanic)   Resp 16   Ht 6' (1.829 m)   Wt 192 lb (87.1 kg)   SpO2 100%   BMI 26.04 kg/m²     General appearance:  No apparent distress, appears stated age and cooperative. HEENT:  Normal cephalic, atraumatic without obvious deformity.  Pupils equal, round, and